# Patient Record
Sex: FEMALE | Race: WHITE | NOT HISPANIC OR LATINO | Employment: FULL TIME | URBAN - METROPOLITAN AREA
[De-identification: names, ages, dates, MRNs, and addresses within clinical notes are randomized per-mention and may not be internally consistent; named-entity substitution may affect disease eponyms.]

---

## 2017-06-21 ENCOUNTER — TRANSCRIBE ORDERS (OUTPATIENT)
Dept: MAMMOGRAPHY | Facility: HOSPITAL | Age: 47
End: 2017-06-21

## 2017-06-21 DIAGNOSIS — Z12.31 VISIT FOR SCREENING MAMMOGRAM: Primary | ICD-10-CM

## 2017-07-18 ENCOUNTER — TRANSCRIBE ORDERS (OUTPATIENT)
Dept: ADMINISTRATIVE | Facility: HOSPITAL | Age: 47
End: 2017-07-18

## 2017-07-18 DIAGNOSIS — Z12.31 VISIT FOR SCREENING MAMMOGRAM: Primary | ICD-10-CM

## 2017-07-21 ENCOUNTER — APPOINTMENT (OUTPATIENT)
Dept: MAMMOGRAPHY | Facility: HOSPITAL | Age: 47
End: 2017-07-21
Attending: OBSTETRICS & GYNECOLOGY

## 2017-07-21 ENCOUNTER — HOSPITAL ENCOUNTER (OUTPATIENT)
Dept: MAMMOGRAPHY | Facility: HOSPITAL | Age: 47
Discharge: HOME | End: 2017-07-21
Attending: OBSTETRICS & GYNECOLOGY | Admitting: OBSTETRICS & GYNECOLOGY

## 2017-07-21 DIAGNOSIS — Z12.31 VISIT FOR SCREENING MAMMOGRAM: ICD-10-CM

## 2017-07-21 PROCEDURE — 77063 BREAST TOMOSYNTHESIS BI: CPT | Performed by: RADIOLOGY

## 2017-07-21 PROCEDURE — G0202 SCR MAMMO BI INCL CAD: HCPCS

## 2017-07-21 PROCEDURE — 77063 BREAST TOMOSYNTHESIS BI: CPT

## 2017-07-21 PROCEDURE — 77067 SCR MAMMO BI INCL CAD: CPT | Performed by: RADIOLOGY

## 2017-07-28 ENCOUNTER — APPOINTMENT (OUTPATIENT)
Dept: MAMMOGRAPHY | Facility: HOSPITAL | Age: 47
End: 2017-07-28
Attending: OBSTETRICS & GYNECOLOGY

## 2018-06-14 ENCOUNTER — TRANSCRIBE ORDERS (OUTPATIENT)
Dept: ADMINISTRATIVE | Facility: HOSPITAL | Age: 48
End: 2018-06-14

## 2018-06-14 DIAGNOSIS — Z12.31 VISIT FOR SCREENING MAMMOGRAM: Primary | ICD-10-CM

## 2018-07-27 ENCOUNTER — HOSPITAL ENCOUNTER (OUTPATIENT)
Dept: MAMMOGRAPHY | Facility: HOSPITAL | Age: 48
Discharge: HOME | End: 2018-07-27
Attending: OBSTETRICS & GYNECOLOGY | Admitting: OBSTETRICS & GYNECOLOGY

## 2018-07-27 DIAGNOSIS — Z12.31 VISIT FOR SCREENING MAMMOGRAM: ICD-10-CM

## 2018-07-27 PROCEDURE — 77063 BREAST TOMOSYNTHESIS BI: CPT

## 2018-07-27 PROCEDURE — 77067 SCR MAMMO BI INCL CAD: CPT

## 2018-07-27 PROCEDURE — 77063 BREAST TOMOSYNTHESIS BI: CPT | Performed by: RADIOLOGY

## 2018-07-27 PROCEDURE — 77067 SCR MAMMO BI INCL CAD: CPT | Performed by: RADIOLOGY

## 2019-06-18 ENCOUNTER — TRANSCRIBE ORDERS (OUTPATIENT)
Dept: ADMINISTRATIVE | Facility: HOSPITAL | Age: 49
End: 2019-06-18

## 2019-06-18 DIAGNOSIS — Z12.31 VISIT FOR SCREENING MAMMOGRAM: Primary | ICD-10-CM

## 2019-08-15 ENCOUNTER — APPOINTMENT (OUTPATIENT)
Dept: LAB | Facility: HOSPITAL | Age: 49
End: 2019-08-15

## 2019-08-15 ENCOUNTER — HOSPITAL ENCOUNTER (OUTPATIENT)
Dept: MAMMOGRAPHY | Facility: HOSPITAL | Age: 49
Discharge: HOME | End: 2019-08-15
Admitting: OBSTETRICS & GYNECOLOGY

## 2019-08-15 ENCOUNTER — TRANSCRIBE ORDERS (OUTPATIENT)
Dept: LAB | Facility: HOSPITAL | Age: 49
End: 2019-08-15

## 2019-08-15 DIAGNOSIS — Z12.31 VISIT FOR SCREENING MAMMOGRAM: ICD-10-CM

## 2019-08-15 DIAGNOSIS — F31.61 MILD MIXED BIPOLAR I DISORDER (HCC): Primary | ICD-10-CM

## 2019-08-15 LAB — HBA1C MFR BLD: 5.1 % (ref 4.8–5.6)

## 2019-08-15 PROCEDURE — 77067 SCR MAMMO BI INCL CAD: CPT

## 2019-08-15 PROCEDURE — 36415 COLL VENOUS BLD VENIPUNCTURE: CPT | Performed by: NURSE PRACTITIONER

## 2019-08-15 PROCEDURE — 77067 SCR MAMMO BI INCL CAD: CPT | Performed by: RADIOLOGY

## 2019-08-15 PROCEDURE — 83036 HEMOGLOBIN GLYCOSYLATED A1C: CPT | Performed by: NURSE PRACTITIONER

## 2019-08-15 PROCEDURE — 77063 BREAST TOMOSYNTHESIS BI: CPT | Performed by: RADIOLOGY

## 2019-08-15 PROCEDURE — 77063 BREAST TOMOSYNTHESIS BI: CPT

## 2020-07-27 ENCOUNTER — TRANSCRIBE ORDERS (OUTPATIENT)
Dept: ADMINISTRATIVE | Facility: HOSPITAL | Age: 50
End: 2020-07-27

## 2020-07-27 DIAGNOSIS — Z12.31 VISIT FOR SCREENING MAMMOGRAM: Primary | ICD-10-CM

## 2021-09-20 ENCOUNTER — TRANSCRIBE ORDERS (OUTPATIENT)
Dept: ADMINISTRATIVE | Facility: HOSPITAL | Age: 51
End: 2021-09-20

## 2021-09-20 DIAGNOSIS — Z12.31 VISIT FOR SCREENING MAMMOGRAM: Primary | ICD-10-CM

## 2021-11-23 ENCOUNTER — HOSPITAL ENCOUNTER (OUTPATIENT)
Dept: MAMMOGRAPHY | Facility: HOSPITAL | Age: 51
Discharge: HOME | End: 2021-11-23

## 2021-11-23 DIAGNOSIS — Z12.31 VISIT FOR SCREENING MAMMOGRAM: ICD-10-CM

## 2022-02-09 ENCOUNTER — APPOINTMENT (OUTPATIENT)
Dept: MAMMOGRAPHY | Facility: HOSPITAL | Age: 52
End: 2022-02-09

## 2022-03-25 ENCOUNTER — HOSPITAL ENCOUNTER (OUTPATIENT)
Dept: MAMMOGRAPHY | Facility: HOSPITAL | Age: 52
Discharge: HOME | End: 2022-03-25
Admitting: OBSTETRICS & GYNECOLOGY

## 2022-03-25 PROCEDURE — 77067 SCR MAMMO BI INCL CAD: CPT

## 2022-03-25 PROCEDURE — 77067 SCR MAMMO BI INCL CAD: CPT | Performed by: RADIOLOGY

## 2022-03-25 PROCEDURE — 77063 BREAST TOMOSYNTHESIS BI: CPT

## 2022-03-25 PROCEDURE — 77063 BREAST TOMOSYNTHESIS BI: CPT | Performed by: RADIOLOGY

## 2022-07-26 ENCOUNTER — LAB (OUTPATIENT)
Dept: LAB | Facility: HOSPITAL | Age: 52
End: 2022-07-26

## 2022-07-26 ENCOUNTER — OFFICE VISIT (OUTPATIENT)
Dept: INTERNAL MEDICINE | Facility: CLINIC | Age: 52
End: 2022-07-26

## 2022-07-26 VITALS
WEIGHT: 139.6 LBS | HEIGHT: 66 IN | TEMPERATURE: 97.2 F | HEART RATE: 83 BPM | SYSTOLIC BLOOD PRESSURE: 122 MMHG | OXYGEN SATURATION: 97 % | DIASTOLIC BLOOD PRESSURE: 72 MMHG | RESPIRATION RATE: 16 BRPM | BODY MASS INDEX: 22.43 KG/M2

## 2022-07-26 DIAGNOSIS — R07.9 CHEST PAIN, UNSPECIFIED TYPE: ICD-10-CM

## 2022-07-26 DIAGNOSIS — Z79.899 HIGH RISK MEDICATION USE: ICD-10-CM

## 2022-07-26 DIAGNOSIS — F31.9 BIPOLAR 1 DISORDER: ICD-10-CM

## 2022-07-26 DIAGNOSIS — G62.9 NEUROPATHY: Primary | ICD-10-CM

## 2022-07-26 DIAGNOSIS — N93.9 ABNORMAL UTERINE BLEEDING: ICD-10-CM

## 2022-07-26 PROCEDURE — 99214 OFFICE O/P EST MOD 30 MIN: CPT | Performed by: NURSE PRACTITIONER

## 2022-07-26 RX ORDER — GABAPENTIN 300 MG/1
300 CAPSULE ORAL 3 TIMES DAILY
Qty: 90 CAPSULE | Refills: 0 | Status: SHIPPED | OUTPATIENT
Start: 2022-07-26 | End: 2022-08-16 | Stop reason: SDUPTHER

## 2022-07-26 RX ORDER — FEXOFENADINE HCL AND PSEUDOEPHEDRINE HCI 180; 240 MG/1; MG/1
1 TABLET, EXTENDED RELEASE ORAL DAILY
COMMUNITY

## 2022-07-26 RX ORDER — MELOXICAM 15 MG/1
TABLET ORAL
COMMUNITY
Start: 2022-06-28

## 2022-07-26 RX ORDER — PROPRANOLOL HYDROCHLORIDE 10 MG/1
10 TABLET ORAL 2 TIMES DAILY
COMMUNITY

## 2022-07-26 NOTE — PROGRESS NOTES
"  New Patient Office Visit      Patient Name: Xiomara Taylor  : 1970   MRN: [id number]     Chief Complaint:    Chief Complaint   Patient presents with   • Back Pain     New patient   • Chest Pain       History of Present Illness: Xiomara Taylor is a 51 y.o. female presents to clinic today to establish care.   Are you currently seeing any other doctors or specialists?   Psychiatrist-Luz Maria Jimenez ([facility])  Ortho -Dr. Pereyra as needed  Previous pcp Dr. Adan  Gynecology-Corine Bright     Back pain  L3-L5? degenerative disc disease; has had steroid shots which usually help. She has occasional flares. She sees a chiropractor.     Neuropathy   S/p Anterior discectomy C7-T1 for foraminal disc herniation in 2018. She continues with  right nerve pain 4-5 right fingers. She has been on Gabapentin 300 mg TID for several years.  It controls the pain. She uses CBD drops also. Denies any excessive drowsiness.     Osteoarthritis  Patient has arthritis in feet and both knees. Patient takes meloxicam daily. Denies any stomach upset or bleeding.     Bipolar disorder and Anxiety  She take seroquel 100 mg daily and lamotrigine 100 mg. She takes propranolol as needed for anxiety; takes 1-2 x a day.     Chest pain  She has been under a lot of stress. She has rare chest pains over the last  6 months lasting for a 1-2 seconds.  Quality is \"squeezing\".  Chest pains are usually associated with anxiety.  She denies any exertional chest pain or shortness of breath.      Last menstrual period 2 years ago; she had recent spotting.     Health Maintenance  She has never had a colorectal screening.  She is due for Pap smear.    Subjective     Review of System: Review of Systems   Constitutional: Negative for fatigue and fever.   HENT: Negative for congestion and rhinorrhea.    Respiratory: Negative for cough, shortness of breath and wheezing.    Cardiovascular: Positive for chest pain. Negative for palpitations.   Gastrointestinal: " Negative for abdominal pain, diarrhea, nausea and vomiting.   Musculoskeletal: Negative for neck pain.   Skin: Negative for rash.   Neurological: Positive for numbness (right finger 4-5 ). Negative for headaches.   Hematological: Negative for adenopathy.   Psychiatric/Behavioral: Negative for dysphoric mood.      I have reviewed the ROS documented by my clinical staff, updated appropriately and I agree. VANESSA Teixeira    Past Medical History:   Past Medical History:   Diagnosis Date   • Anxiety    • Arthritis    • Bipolar 1 disorder (HCC)    • Depression    • Pancreatitis        Past Surgical History:   Past Surgical History:   Procedure Laterality Date   • CERVICAL DISCECTOMY ANTERIOR     • THORACIC DISCECTOMY         Family History:   Family History   Problem Relation Age of Onset   • Hypertension Mother    • Arthritis Mother    • Mental illness Maternal Aunt    • Hypertension Maternal Aunt    • Diabetes Maternal Aunt    • Cancer Maternal Aunt    • Heart disease Maternal Uncle    • Diabetes Maternal Uncle    • Mental illness Maternal Grandmother    • Breast cancer Other         age unknown   • Ovarian cancer Neg Hx        Social History:   Social History     Socioeconomic History   • Marital status: Single   Tobacco Use   • Smoking status: Unknown If Ever Smoked   • Smokeless tobacco: Never Used   Vaping Use   • Vaping Use: Never used   Substance and Sexual Activity   • Alcohol use: Not Currently     Comment: quit in 2014   • Drug use: Never   • Sexual activity: Defer       Medications:     Current Outpatient Medications:   •  albuterol sulfate  (90 Base) MCG/ACT inhaler, Every 4 (Four) Hours., Disp: , Rfl:   •  CBD (cannabidiol) oral oil, cannabidiol (CBD) oral oil  Take by oral route., Disp: , Rfl:   •  fexofenadine-pseudoephedrine (ALLEGRA-D 24) 180-240 MG per 24 hr tablet, Take 1 tablet by mouth Daily., Disp: , Rfl:   •  gabapentin (NEURONTIN) 300 MG capsule, Take 1 capsule by mouth 3 (Three)  "Times a Day., Disp: 90 capsule, Rfl: 0  •  lamoTRIgine (LaMICtal) 100 MG tablet, Take 100 mg by mouth Daily., Disp: , Rfl:   •  meloxicam (MOBIC) 15 MG tablet, , Disp: , Rfl:   •  propranolol (INDERAL) 10 MG tablet, Take 10 mg by mouth 2 (Two) Times a Day., Disp: , Rfl:   •  QUEtiapine (SEROquel) 100 MG tablet, , Disp: , Rfl:     Allergies:   Allergies   Allergen Reactions   • Augmentin [Amoxicillin-Pot Clavulanate] Hives   • Diflucan [Fluconazole] Hives       Objective     Physical Exam:   Vital Signs:   Vitals:    07/26/22 1029   BP: 122/72   BP Location: Right arm   Patient Position: Sitting   Cuff Size: Adult   Pulse: 83   Resp: 16   Temp: 97.2 °F (36.2 °C)   TempSrc: Infrared   SpO2: 97%   Weight: 63.3 kg (139 lb 9.6 oz)   Height: 167 cm (65.75\")   PainSc:   4     Body mass index is 22.7 kg/m². BMI is within normal parameters. No other follow-up for BMI required.      Physical Exam  Constitutional:       General: She is not in acute distress.     Appearance: She is not ill-appearing.   HENT:      Head: Normocephalic.   Cardiovascular:      Rate and Rhythm: Normal rate and regular rhythm.      Heart sounds: Normal heart sounds. No murmur heard.  Pulmonary:      Breath sounds: Normal breath sounds.   Abdominal:      General: Bowel sounds are normal.      Tenderness: There is no abdominal tenderness.   Musculoskeletal:         General: No tenderness. Normal range of motion.   Lymphadenopathy:      Cervical: No cervical adenopathy.   Neurological:      General: No focal deficit present.      Mental Status: She is oriented to person, place, and time.   Psychiatric:         Mood and Affect: Mood normal.         Assessment / Plan      Assessment/Plan:   Diagnoses and all orders for this visit:    1. Neuropathy (Primary)  -     gabapentin (NEURONTIN) 300 MG capsule; Take 1 capsule by mouth 3 (Three) Times a Day.  Dispense: 90 capsule; Refill: 0      The patient is taking the following controlled substance(s) on a long " term (greater than three months) basis: Gabapentin.    A history was obtained from the patient and the following were reviewed: family history, social history, psychiatric history, and substance abuse history.  A baseline assessment was performed and includes a controlled substance agreement, urine drug screen, BISI (within 12 months prior to today's encounter), and a physical exam, if appropriate, was performed within the past year.  It is medically appropriate to prescribe her the medication(s) above.  If applicable, prior treatment records were obtained and reviewed to justify long term prescribing of controlled substances.  The patient was educated on the following: Limited use of the medication, need to discontinue the medication when her condition resolves, proper storage and disposal of medication(s), and risks and dangers associated with controlled substances including tolerance, dependence, and addiction.  A written informed consent was obtained and includes objectives of treatment, further diagnostic testing if appropriate, and an exit strategy for discontinuing the medication on the condition resolves, if appropriate.  In addition, if appropriate, the patient will be screened for other medical conditions that may impact the prescribing of controlled substances.  Previous treatments have been tried including trials of noncontrolled substances or lower doses of controlled substances.  The controlled medication(s) have been titrated to the level appropriate and necessary and the medication(s) are not causing unacceptable side effects.   Bisi obtained.     2. Abnormal uterine bleeding  -     US Non-ob Transvaginal; Future    3. Bipolar 1 disorder (HCC)  Stable on current medications.    4. Chest pain, unspecified type  Patient declined any testing at this time.  She will monitor her symptoms and let me know if they worsen.  Symptoms could be related to stress and anxiety or they could be cardiac in  nature.  If any worsening I would like to do a full cardiac work-up.    5. High risk medication use  -     Cancel: Urine Drug Screen - Urine, Clean Catch; Future  -     Compliance Drug Analysis, Ur - Urine, Clean Catch; Future  -     Compliance Drug Analysis, Ur - Urine, Clean Catch             I explained and discussed patient's condition and plan of care.  Discussed when to follow-up.  Discussed possible red flags and how to follow-up with those.  Viewed patient's medications and discussed common side effects. Patient to continue current medications as advised.  Be compliant with medications. Patient to let me know if she has any changes in health, does not tolerate medication, or any future concerns about treatment. Patient verbalized understanding and agreement with plan of care.     Follow Up:   Return in about 3 months (around 10/26/2022) for Annual.    VANESSA Teixeira  [facility]

## 2022-08-01 LAB — DRUGS UR: NORMAL

## 2022-08-16 DIAGNOSIS — G62.9 NEUROPATHY: ICD-10-CM

## 2022-08-16 NOTE — TELEPHONE ENCOUNTER
Caller: Xiomara Taylor    Relationship: Self    Best call back number: [phone]    Requested Prescriptions:   Requested Prescriptions     Pending Prescriptions Disp Refills   • gabapentin (NEURONTIN) 300 MG capsule 90 capsule 0     Sig: Take 1 capsule by mouth 3 (Three) Times a Day.        Pharmacy where request should be sent: [facility] [address] [place] [address] [address] [facility] AT [address] [phone] [facility] [phone] FX     Additional details provided by patient:   Does the patient have less than a 3 day supply:  [] Yes  [x] No    Nevaeh Carlos Rep   08/16/22 10:17 EDT

## 2022-08-17 ENCOUNTER — APPOINTMENT (OUTPATIENT)
Dept: ULTRASOUND IMAGING | Facility: HOSPITAL | Age: 52
End: 2022-08-17

## 2022-08-19 ENCOUNTER — APPOINTMENT (OUTPATIENT)
Dept: ULTRASOUND IMAGING | Facility: HOSPITAL | Age: 52
End: 2022-08-19

## 2022-08-24 RX ORDER — GABAPENTIN 300 MG/1
300 CAPSULE ORAL 3 TIMES DAILY
Qty: 90 CAPSULE | Refills: 0 | Status: SHIPPED | OUTPATIENT
Start: 2022-08-24 | End: 2022-10-03

## 2022-09-20 ENCOUNTER — OFFICE VISIT (OUTPATIENT)
Dept: INTERNAL MEDICINE | Facility: CLINIC | Age: 52
End: 2022-09-20

## 2022-09-20 VITALS
SYSTOLIC BLOOD PRESSURE: 126 MMHG | DIASTOLIC BLOOD PRESSURE: 84 MMHG | HEIGHT: 66 IN | RESPIRATION RATE: 16 BRPM | BODY MASS INDEX: 22.92 KG/M2 | HEART RATE: 87 BPM | OXYGEN SATURATION: 98 % | TEMPERATURE: 96.6 F | WEIGHT: 142.6 LBS

## 2022-09-20 DIAGNOSIS — G62.9 NEUROPATHY: Primary | ICD-10-CM

## 2022-09-20 DIAGNOSIS — F31.9 BIPOLAR 1 DISORDER: ICD-10-CM

## 2022-09-20 PROCEDURE — 99213 OFFICE O/P EST LOW 20 MIN: CPT | Performed by: NURSE PRACTITIONER

## 2022-09-20 NOTE — PROGRESS NOTES
Patient Name: Xiomara Taylor  : 1970   MRN: [id number]     Chief Complaint:    Chief Complaint   Patient presents with   • clearance to drive     Clearance to drive at night.        History of Present Illness: Xiomara Taylor is a 51 y.o. female presents to clinic forfor clearance to drive. Her license had a vision restriction that needs to be removed. She wears glasses. She has never had a seizure.    Neuropathy   S/p Anterior discectomy C7-T1 for foraminal disc herniation in 2018. She has  right nerve pain 4-5 right fingers. She has been on Gabapentin 300 mg TID for several years.  It controls the pain. She uses CBD drops also. Denies any excessive drowsiness.        Bipolar disorder and Anxiety  She take seroquel 100 mg daily and lamotrigine 100 mg. She takes propranolol as needed for anxiety; takes 1-2 x a day. Denies suicidal ideations. Mood is stable.             Subjective     Review of System: Review of Systems   Constitutional: Negative for fatigue and fever.   HENT: Negative for congestion and rhinorrhea.    Respiratory: Negative for cough, shortness of breath and wheezing.    Cardiovascular: Negative for chest pain and palpitations.   Gastrointestinal: Negative for abdominal pain, diarrhea, nausea and vomiting.   Genitourinary: Negative for dysuria.   Musculoskeletal: Negative for myalgias.   Skin: Negative for rash.   Neurological: Positive for numbness. Negative for seizures and headaches.   Hematological: Negative for adenopathy.   Psychiatric/Behavioral: Negative for dysphoric mood.        Medications:     Current Outpatient Medications:   •  albuterol sulfate  (90 Base) MCG/ACT inhaler, Every 4 (Four) Hours., Disp: , Rfl:   •  CBD (cannabidiol) oral oil, cannabidiol (CBD) oral oil  Take by oral route., Disp: , Rfl:   •  fexofenadine-pseudoephedrine (ALLEGRA-D 24) 180-240 MG per 24 hr tablet, Take 1 tablet by mouth Daily., Disp: , Rfl:   •  gabapentin (NEURONTIN) 300 MG capsule, Take 1  "capsule by mouth 3 (Three) Times a Day., Disp: 90 capsule, Rfl: 0  •  lamoTRIgine (LaMICtal) 100 MG tablet, Take 100 mg by mouth Daily., Disp: , Rfl:   •  meloxicam (MOBIC) 15 MG tablet, , Disp: , Rfl:   •  propranolol (INDERAL) 10 MG tablet, Take 10 mg by mouth 2 (Two) Times a Day., Disp: , Rfl:   •  QUEtiapine (SEROquel) 100 MG tablet, , Disp: , Rfl:     Allergies:   Allergies   Allergen Reactions   • Augmentin [Amoxicillin-Pot Clavulanate] Hives   • Diflucan [Fluconazole] Hives       Objective     Physical Exam:   Vital Signs:   Vitals:    09/20/22 1520   BP: 126/84   BP Location: Right arm   Patient Position: Sitting   Cuff Size: Adult   Pulse: 87   Resp: 16   Temp: 96.6 °F (35.9 °C)   TempSrc: Infrared   SpO2: 98%   Weight: 64.7 kg (142 lb 9.6 oz)   Height: 167 cm (65.75\")   PainSc: 0-No pain     Body mass index is 23.19 kg/m². BMI is within normal parameters. No other follow-up for BMI required.      Physical Exam  Constitutional:       General: She is not in acute distress.     Appearance: She is not ill-appearing.   HENT:      Head: Normocephalic.   Cardiovascular:      Rate and Rhythm: Normal rate and regular rhythm.      Heart sounds: Normal heart sounds. No murmur heard.  Pulmonary:      Breath sounds: Normal breath sounds.   Abdominal:      General: Bowel sounds are normal.      Tenderness: There is no abdominal tenderness.   Neurological:      General: No focal deficit present.      Mental Status: She is oriented to person, place, and time.   Psychiatric:         Mood and Affect: Mood normal.         Assessment / Plan      Assessment/Plan:   Diagnoses and all orders for this visit:    1. Neuropathy (Primary)  Continue current medications.    2. Bipolar 1 disorder (HCC)  Continue current medications.     Forms completed.            Follow Up:   Return for Next scheduled follow up.    VANESSA Teixeira  [facility]  "

## 2022-09-27 ENCOUNTER — TELEPHONE (OUTPATIENT)
Dept: INTERNAL MEDICINE | Facility: CLINIC | Age: 52
End: 2022-09-27

## 2022-09-28 ENCOUNTER — TELEPHONE (OUTPATIENT)
Dept: INTERNAL MEDICINE | Facility: CLINIC | Age: 52
End: 2022-09-28

## 2022-10-03 ENCOUNTER — TELEPHONE (OUTPATIENT)
Dept: INTERNAL MEDICINE | Facility: CLINIC | Age: 52
End: 2022-10-03

## 2022-10-03 DIAGNOSIS — G62.9 NEUROPATHY: ICD-10-CM

## 2022-10-03 RX ORDER — GABAPENTIN 300 MG/1
CAPSULE ORAL
Qty: 90 CAPSULE | Refills: 0 | Status: SHIPPED | OUTPATIENT
Start: 2022-10-03 | End: 2022-11-03

## 2022-10-03 NOTE — TELEPHONE ENCOUNTER
Rx Refill Note  Requested Prescriptions     Pending Prescriptions Disp Refills   • gabapentin (NEURONTIN) 300 MG capsule [Pharmacy Med Name: GABAPENTIN 300 MG CAPSULE] 90 capsule      Sig: TAKE ONE CAPSULE BY MOUTH THREE TIMES A DAY      Last office visit with prescribing clinician: 9/20/2022      Next office visit with prescribing clinician: 10/27/2022            Maryam Solis MA  10/03/22, 14:52 EDT

## 2022-10-03 NOTE — TELEPHONE ENCOUNTER
----- Message from Xiomara Taylor sent at 10/3/2022  9:03 AM EDT -----  Regarding: Denis   Their fax number is [phone]    The phone number is [phone]    I have been trying to get the medical review board to correct their own mistake since early August. They just keep asking for additional forms like this. I’m sorry for all the trouble. Thank you so much for your help!

## 2022-10-03 NOTE — TELEPHONE ENCOUNTER
I spoke with patient and notified her that we cannot release her jacklyn to anyone. She verbalized understanding.    She asks that we go ahead and fax in the rest of her packet

## 2022-10-03 NOTE — TELEPHONE ENCOUNTER
----- Message from Xiomara Taylor sent at 10/3/2022  6:45 AM EDT -----  Regarding: Jacklyn   I am writing to clarify my request for the jacklyn report. I do not need to have it released to me, only to the medical review board. The fax number is I. The packet that [employer]. I just need the packet faxed to that number. This involves having a restriction. That was placed in my drivers license in error. That packet is the only thing that will allow that. Please please please at least let me know asap that you will be able to fax that. I don’t need the document to ever be in my hands. Just the review board. I have to have this faxed in order to correct my license.  I’m begging you to either fax it or let me know if you can’t. I have to fix my license! Please help!

## 2022-10-27 ENCOUNTER — LAB (OUTPATIENT)
Dept: LAB | Facility: HOSPITAL | Age: 52
End: 2022-10-27

## 2022-10-27 ENCOUNTER — OFFICE VISIT (OUTPATIENT)
Dept: INTERNAL MEDICINE | Facility: CLINIC | Age: 52
End: 2022-10-27

## 2022-10-27 VITALS
DIASTOLIC BLOOD PRESSURE: 88 MMHG | HEIGHT: 66 IN | BODY MASS INDEX: 22.5 KG/M2 | OXYGEN SATURATION: 98 % | TEMPERATURE: 97.3 F | WEIGHT: 140 LBS | RESPIRATION RATE: 18 BRPM | SYSTOLIC BLOOD PRESSURE: 128 MMHG | HEART RATE: 81 BPM

## 2022-10-27 DIAGNOSIS — M25.441 FINGER JOINT SWELLING, RIGHT: ICD-10-CM

## 2022-10-27 DIAGNOSIS — Z12.31 ENCOUNTER FOR SCREENING MAMMOGRAM FOR BREAST CANCER: ICD-10-CM

## 2022-10-27 DIAGNOSIS — Z23 COVID-19 VACCINE ADMINISTERED: ICD-10-CM

## 2022-10-27 DIAGNOSIS — Z13.6 ENCOUNTER FOR LIPID SCREENING FOR CARDIOVASCULAR DISEASE: ICD-10-CM

## 2022-10-27 DIAGNOSIS — Z01.419 ENCOUNTER FOR CERVICAL PAP SMEAR WITH PELVIC EXAM: ICD-10-CM

## 2022-10-27 DIAGNOSIS — Z12.12 ENCOUNTER FOR COLORECTAL CANCER SCREENING: ICD-10-CM

## 2022-10-27 DIAGNOSIS — F31.9 BIPOLAR 1 DISORDER: ICD-10-CM

## 2022-10-27 DIAGNOSIS — Z23 NEEDS FLU SHOT: ICD-10-CM

## 2022-10-27 DIAGNOSIS — G62.9 NEUROPATHY: ICD-10-CM

## 2022-10-27 DIAGNOSIS — Z00.01 ABNORMAL WELLNESS EXAM: Primary | ICD-10-CM

## 2022-10-27 DIAGNOSIS — Z11.59 ENCOUNTER FOR HEPATITIS C SCREENING TEST FOR LOW RISK PATIENT: ICD-10-CM

## 2022-10-27 DIAGNOSIS — Z13.220 ENCOUNTER FOR LIPID SCREENING FOR CARDIOVASCULAR DISEASE: ICD-10-CM

## 2022-10-27 DIAGNOSIS — Z12.11 ENCOUNTER FOR COLORECTAL CANCER SCREENING: ICD-10-CM

## 2022-10-27 DIAGNOSIS — R31.9 HEMATURIA, UNSPECIFIED TYPE: ICD-10-CM

## 2022-10-27 LAB
25(OH)D3 SERPL-MCNC: 35.6 NG/ML (ref 30–100)
ALBUMIN SERPL-MCNC: 4.5 G/DL (ref 3.5–5.2)
ALBUMIN/GLOB SERPL: 2.4 G/DL
ALP SERPL-CCNC: 82 U/L (ref 39–117)
ALT SERPL W P-5'-P-CCNC: 10 U/L (ref 1–33)
ANION GAP SERPL CALCULATED.3IONS-SCNC: 8.9 MMOL/L (ref 5–15)
AST SERPL-CCNC: 15 U/L (ref 1–32)
BACTERIA UR QL AUTO: ABNORMAL /HPF
BASOPHILS # BLD AUTO: 0.07 10*3/MM3 (ref 0–0.2)
BASOPHILS NFR BLD AUTO: 0.9 % (ref 0–1.5)
BILIRUB BLD-MCNC: NEGATIVE MG/DL
BILIRUB SERPL-MCNC: 0.3 MG/DL (ref 0–1.2)
BUN SERPL-MCNC: 15 MG/DL (ref 6–20)
BUN/CREAT SERPL: 18.8 (ref 7–25)
CALCIUM SPEC-SCNC: 9.7 MG/DL (ref 8.6–10.5)
CHLORIDE SERPL-SCNC: 105 MMOL/L (ref 98–107)
CHOLEST SERPL-MCNC: 198 MG/DL (ref 0–200)
CLARITY, POC: CLEAR
CO2 SERPL-SCNC: 28.1 MMOL/L (ref 22–29)
COLOR UR: YELLOW
CREAT SERPL-MCNC: 0.8 MG/DL (ref 0.57–1)
DEPRECATED RDW RBC AUTO: 38.6 FL (ref 37–54)
EGFRCR SERPLBLD CKD-EPI 2021: 89.3 ML/MIN/1.73
EOSINOPHIL # BLD AUTO: 0.07 10*3/MM3 (ref 0–0.4)
EOSINOPHIL NFR BLD AUTO: 0.9 % (ref 0.3–6.2)
ERYTHROCYTE [DISTWIDTH] IN BLOOD BY AUTOMATED COUNT: 12.1 % (ref 12.3–15.4)
EXPIRATION DATE: ABNORMAL
GLOBULIN UR ELPH-MCNC: 1.9 GM/DL
GLUCOSE SERPL-MCNC: 74 MG/DL (ref 65–99)
GLUCOSE UR STRIP-MCNC: NEGATIVE MG/DL
HCT VFR BLD AUTO: 43.4 % (ref 34–46.6)
HCV AB SER DONR QL: NORMAL
HDLC SERPL-MCNC: 69 MG/DL (ref 40–60)
HGB BLD-MCNC: 15 G/DL (ref 12–15.9)
HYALINE CASTS UR QL AUTO: ABNORMAL /LPF
IMM GRANULOCYTES # BLD AUTO: 0.01 10*3/MM3 (ref 0–0.05)
IMM GRANULOCYTES NFR BLD AUTO: 0.1 % (ref 0–0.5)
KETONES UR QL: NEGATIVE
LDLC SERPL CALC-MCNC: 111 MG/DL (ref 0–100)
LDLC/HDLC SERPL: 1.58 {RATIO}
LEUKOCYTE EST, POC: NEGATIVE
LYMPHOCYTES # BLD AUTO: 2.25 10*3/MM3 (ref 0.7–3.1)
LYMPHOCYTES NFR BLD AUTO: 29.4 % (ref 19.6–45.3)
Lab: ABNORMAL
MCH RBC QN AUTO: 29.8 PG (ref 26.6–33)
MCHC RBC AUTO-ENTMCNC: 34.6 G/DL (ref 31.5–35.7)
MCV RBC AUTO: 86.3 FL (ref 79–97)
MONOCYTES # BLD AUTO: 0.59 10*3/MM3 (ref 0.1–0.9)
MONOCYTES NFR BLD AUTO: 7.7 % (ref 5–12)
NEUTROPHILS NFR BLD AUTO: 4.67 10*3/MM3 (ref 1.7–7)
NEUTROPHILS NFR BLD AUTO: 61 % (ref 42.7–76)
NITRITE UR-MCNC: NEGATIVE MG/ML
NRBC BLD AUTO-RTO: 0 /100 WBC (ref 0–0.2)
PH UR: 8 [PH] (ref 5–8)
PLATELET # BLD AUTO: 268 10*3/MM3 (ref 140–450)
PMV BLD AUTO: 9.1 FL (ref 6–12)
POTASSIUM SERPL-SCNC: 4.2 MMOL/L (ref 3.5–5.2)
PROT SERPL-MCNC: 6.4 G/DL (ref 6–8.5)
PROT UR STRIP-MCNC: NEGATIVE MG/DL
RBC # BLD AUTO: 5.03 10*6/MM3 (ref 3.77–5.28)
RBC # UR STRIP: ABNORMAL /HPF
RBC # UR STRIP: ABNORMAL /UL
REF LAB TEST METHOD: ABNORMAL
SODIUM SERPL-SCNC: 142 MMOL/L (ref 136–145)
SP GR UR: 1.01 (ref 1–1.03)
SQUAMOUS #/AREA URNS HPF: ABNORMAL /HPF
TRIGL SERPL-MCNC: 100 MG/DL (ref 0–150)
TSH SERPL DL<=0.05 MIU/L-ACNC: 0.79 UIU/ML (ref 0.27–4.2)
UROBILINOGEN UR QL: NORMAL
VLDLC SERPL-MCNC: 18 MG/DL (ref 5–40)
WBC # UR STRIP: ABNORMAL /HPF
WBC NRBC COR # BLD: 7.66 10*3/MM3 (ref 3.4–10.8)

## 2022-10-27 PROCEDURE — 0124A PR ADM SARSCOV2 30MCG/0.3ML BST: CPT | Performed by: NURSE PRACTITIONER

## 2022-10-27 PROCEDURE — 91312 COVID-19 (PFIZER) BIVALENT BOOSTER 12+YRS: CPT | Performed by: NURSE PRACTITIONER

## 2022-10-27 PROCEDURE — 81015 MICROSCOPIC EXAM OF URINE: CPT | Performed by: NURSE PRACTITIONER

## 2022-10-27 PROCEDURE — 82306 VITAMIN D 25 HYDROXY: CPT | Performed by: NURSE PRACTITIONER

## 2022-10-27 PROCEDURE — 81003 URINALYSIS AUTO W/O SCOPE: CPT | Performed by: NURSE PRACTITIONER

## 2022-10-27 PROCEDURE — 90471 IMMUNIZATION ADMIN: CPT | Performed by: NURSE PRACTITIONER

## 2022-10-27 PROCEDURE — 80061 LIPID PANEL: CPT | Performed by: NURSE PRACTITIONER

## 2022-10-27 PROCEDURE — 80050 GENERAL HEALTH PANEL: CPT | Performed by: NURSE PRACTITIONER

## 2022-10-27 PROCEDURE — 99396 PREV VISIT EST AGE 40-64: CPT | Performed by: NURSE PRACTITIONER

## 2022-10-27 PROCEDURE — 90686 IIV4 VACC NO PRSV 0.5 ML IM: CPT | Performed by: NURSE PRACTITIONER

## 2022-10-27 PROCEDURE — 86803 HEPATITIS C AB TEST: CPT | Performed by: NURSE PRACTITIONER

## 2022-10-27 RX ORDER — LAMOTRIGINE 200 MG/1
TABLET ORAL
COMMUNITY
Start: 2022-10-07

## 2022-10-27 RX ORDER — METHYLPREDNISOLONE 4 MG/1
TABLET ORAL
Qty: 21 TABLET | Refills: 0 | Status: SHIPPED | OUTPATIENT
Start: 2022-10-27 | End: 2023-01-16

## 2022-10-27 NOTE — PROGRESS NOTES
Female Physical Note      Patient Name: Xiomara Taylor  : 1970   MRN: [id number]     Chief Complaint:    Chief Complaint   Patient presents with   • Annual Exam       History of Present Illness: Xiomara Taylor is a 52 y.o. female who is here today for their annual health maintenance and physical.     Psychiatrist-Luz Maria Jimenez ([facility])  Ortho -Dr. Pereyra as needed  Previous pcp Dr. Adan    Sleep: 6.5-7 hours/ night      Regular dental visits: Yes, every 6 months  Regular eye exams: Yes, yearly, wears glasses    neuropathy   S/p Anterior discectomy C7-T1 for foraminal disc herniation in 2018. She has  right nerve pain 4-5 right fingers. She has been on Gabapentin 300 mg TID for several years.  It controls the pain. She uses CBD drops also. Denies any excessive drowsiness. It controls her symptoms.     Osteoarthritis  Patient has arthritis in feet and both knees. She has right finger joint swelling. Patient takes meloxicam daily. Denies any stomach upset or bleeding.     Bipolar disorder and Anxiety  She take seroquel 100 mg daily and lamotrigine 100 mg. She takes propranolol as needed for anxiety; takes 1-2 x a day. Sx controlled.   Subjective     Review of System: Review of Systems   Constitutional: Negative for fatigue and fever.   HENT: Negative for congestion and rhinorrhea.    Respiratory: Negative for shortness of breath and wheezing.    Cardiovascular: Negative for chest pain and palpitations.   Gastrointestinal: Negative for abdominal pain, diarrhea, nausea and vomiting.   Genitourinary: Negative for dysuria.   Musculoskeletal: Negative for myalgias.   Skin: Negative for rash.   Neurological: Negative for headaches.   Hematological: Negative for adenopathy.   Psychiatric/Behavioral: Negative for dysphoric mood.    GYN ROS: , no abnormal bleeding, pelvic pain or discharge, no breast pain or new or enlarging lumps on self exam. No neurological complaints.    I have reviewed the ROS documented by  my clinical staff, updated appropriately and I agree. VANESSA Teixeira    Past Medical History:   Past Medical History:   Diagnosis Date   • Anxiety    • Arthritis    • Bipolar 1 disorder (HCC)    • Depression    • Pancreatitis        Past Surgical History:   Past Surgical History:   Procedure Laterality Date   • CERVICAL DISCECTOMY ANTERIOR     • THORACIC DISCECTOMY         Family History:   Family History   Problem Relation Age of Onset   • Hypertension Mother    • Arthritis Mother    • Mental illness Maternal Aunt    • Hypertension Maternal Aunt    • Diabetes Maternal Aunt    • Cancer Maternal Aunt    • Heart disease Maternal Uncle    • Diabetes Maternal Uncle    • Mental illness Maternal Grandmother    • Breast cancer Other         age unknown   • Ovarian cancer Neg Hx        Social History:   Social History     Socioeconomic History   • Marital status: Single   Tobacco Use   • Smoking status: Some Days     Packs/day: 0.50     Years: 0.50     Pack years: 0.25     Types: Cigarettes   • Smokeless tobacco: Never   Vaping Use   • Vaping Use: Never used   Substance and Sexual Activity   • Alcohol use: Not Currently     Comment: quit in 2014   • Drug use: Never   • Sexual activity: Defer       Medications:     Current Outpatient Medications:   •  albuterol sulfate  (90 Base) MCG/ACT inhaler, Every 4 (Four) Hours., Disp: , Rfl:   •  CBD (cannabidiol) oral oil, cannabidiol (CBD) oral oil  Take by oral route., Disp: , Rfl:   •  fexofenadine-pseudoephedrine (ALLEGRA-D 24) 180-240 MG per 24 hr tablet, Take 1 tablet by mouth Daily., Disp: , Rfl:   •  gabapentin (NEURONTIN) 300 MG capsule, TAKE ONE CAPSULE BY MOUTH THREE TIMES A DAY, Disp: 90 capsule, Rfl: 0  •  lamoTRIgine (LaMICtal) 200 MG tablet, , Disp: , Rfl:   •  meloxicam (MOBIC) 15 MG tablet, , Disp: , Rfl:   •  propranolol (INDERAL) 10 MG tablet, Take 10 mg by mouth 2 (Two) Times a Day., Disp: , Rfl:   •  QUEtiapine (SEROquel) 100 MG tablet, , Disp: ,  "Rfl:   •  methylPREDNISolone (MEDROL) 4 MG dose pack, Take as directed on package instructions., Disp: 21 tablet, Rfl: 0    Allergies:   Allergies   Allergen Reactions   • Augmentin [Amoxicillin-Pot Clavulanate] Hives   • Diflucan [Fluconazole] Hives       Immunizations:  Td/Tdap(Booster Q 10 yrs): uptodate    Flu (Yearly): Advised and administered.   Hep C: Screen per guidelines    Colorectal Screening:   ordered  Last Completed Colonoscopy     This patient has no relevant Health Maintenance data.        Pap:  completed  Last Completed Pap Smear     This patient has no relevant Health Maintenance data.        Mammogram:    Last Completed Mammogram          Ordered - MAMMOGRAM (Every 2 Years) Ordered on 10/27/2022    03/25/2022  Mammo Screening Digital Tomosynthesis Bilateral With CAD    08/15/2019  Mammo Screening Digital Tomosynthesis Bilateral With CAD    07/27/2018  Mammo Screening Digital Tomosynthesis Bilateral With CAD    07/21/2017  Mammo Screening Digital Tomosynthesis Bilateral With CAD    07/05/2016  Mammo screening digital tomosynthesis bilateral w cad    Only the first 5 history entries have been loaded, but more history exists.                   Objective     Physical Exam:  Vital Signs:   Vitals:    10/27/22 0828   BP: 128/88   BP Location: Right arm   Patient Position: Sitting   Cuff Size: Adult   Pulse: 81   Resp: 18   Temp: 97.3 °F (36.3 °C)   TempSrc: Infrared   SpO2: 98%   Weight: 63.5 kg (140 lb)   Height: 167.6 cm (66\")   PainSc:   2     Body mass index is 22.6 kg/m². BMI is within normal parameters. No other follow-up for BMI required.      Physical Exam  Vitals and nursing note reviewed.   Constitutional:       General: She is not in acute distress.     Appearance: Normal appearance. She is not ill-appearing.   HENT:      Head: Normocephalic.      Right Ear: Tympanic membrane, ear canal and external ear normal. There is no impacted cerumen.      Left Ear: Tympanic membrane, ear canal and " external ear normal. There is no impacted cerumen.      Nose: No septal deviation, nasal tenderness or rhinorrhea.      Mouth/Throat:      Mouth: Mucous membranes are moist.      Pharynx: Oropharynx is clear. No oropharyngeal exudate or posterior oropharyngeal erythema.   Eyes:      General:         Right eye: No discharge.         Left eye: No discharge.      Extraocular Movements: Extraocular movements intact.      Conjunctiva/sclera: Conjunctivae normal.      Pupils: Pupils are equal, round, and reactive to light.   Neck:      Thyroid: No thyromegaly.      Vascular: No carotid bruit.   Cardiovascular:      Rate and Rhythm: Normal rate and regular rhythm.      Pulses: Normal pulses.      Heart sounds: Normal heart sounds. No murmur heard.    No gallop.   Pulmonary:      Effort: Pulmonary effort is normal.      Breath sounds: Normal breath sounds. No wheezing, rhonchi or rales.   Abdominal:      General: Bowel sounds are normal.      Palpations: Abdomen is soft. There is no mass.      Tenderness: There is no abdominal tenderness. There is no right CVA tenderness, left CVA tenderness or rebound.   Musculoskeletal:         General: No swelling or tenderness. Normal range of motion.      Cervical back: Normal range of motion.      Right lower leg: No edema.      Left lower leg: No edema.   Lymphadenopathy:      Cervical: No cervical adenopathy.   Skin:     General: Skin is warm and dry.      Capillary Refill: Capillary refill takes less than 2 seconds.      Findings: No erythema or rash.   Neurological:      General: No focal deficit present.      Mental Status: She is alert and oriented to person, place, and time.      Motor: No weakness.   Psychiatric:         Mood and Affect: Mood normal.         Behavior: Behavior is cooperative.         Thought Content: Thought content normal.         Cognition and Memory: She does not exhibit impaired recent memory.         Judgment: Judgment normal.         PELVIC EXAM:  VULVA:  normal appearing vulva with no masses, tenderness or lesions  VAGINA: normal appearing vagina with normal color and discharge, no lesions  CERVIX: normal appearing cervix without discharge or lesions, cervical discharge present - clear, cervical motion tenderness absent, multiparous os  UTERUS: uterus is normal size, shape, consistency and nontender  ADNEXA: normal adnexa in size, nontender and no masses  PAP: Pap smear done today, thin-prep method,exam chaperoned by  Nadia Pablo LPN      BREAST EXAM:  no suspicious masses, no skin or nipple changes or axillary nodes, symmetric fibrous changes in both upper outer quadrants      Assessment / Plan      Assessment/Plan:   Diagnoses and all orders for this visit:    1. Abnormal wellness exam (Primary)  -     Comprehensive Metabolic Panel; Future  -     TSH; Future  -     CBC & Differential; Future  -     POC Urinalysis Dipstick, Automated    2. Neuropathy  Continue gabapentin.      3. Bipolar 1 disorder (HCC)  Continue current medications.    4. Encounter for cervical Pap smear with pelvic exam  -     LIQUID-BASED PAP SMEAR, [facility] ([facility])    5. Encounter for hepatitis C screening test for low risk patient  -     Hepatitis C Antibody; Future    6. Encounter for screening mammogram for breast cancer  -     Mammo Screening Digital Tomosynthesis Bilateral With CAD; Future    7. Finger joint swelling, right  -     methylPREDNISolone (MEDROL) 4 MG dose pack; Take as directed on package instructions.  Dispense: 21 tablet; Refill: 0  -     Vitamin D,25-Hydroxy; Future    8. Encounter for colorectal cancer screening  -     Ambulatory Referral For Screening Colonoscopy    9. Encounter for lipid screening for cardiovascular disease  -     Lipid Panel; Future        Follow Up:   Return in about 3 months (around 1/27/2023) for Recheck.    Healthcare Maintenance:   Counseling provided on     Health Maintenance, Female  Adopting a healthy lifestyle and getting preventive  care can go a long way to promote health and wellness. Talk with your health care provider about what schedule of regular examinations is right for you. This is a good chance for you to check in with your provider about disease prevention and staying healthy.  In between checkups, there are plenty of things you can do on your own. Experts have done a lot of research about which lifestyle changes and preventive measures are most likely to keep you healthy. Ask your health care provider for more information.  Weight and diet  Eat a healthy diet  · Be sure to include plenty of vegetables, fruits, low-fat dairy products, and lean protein.  · Do not eat a lot of foods high in solid fats, added sugars, or salt.  · Get regular exercise. This is one of the most important things you can do for your health.  ? Most adults should exercise for at least 150 minutes each week. The exercise should increase your heart rate and make you sweat (moderate-intensity exercise).  ? Most adults should also do strengthening exercises at least twice a week. This is in addition to the moderate-intensity exercise.     Maintain a healthy weight  · Body mass index (BMI) is a measurement that can be used to identify possible weight problems. It estimates body fat based on height and weight. Your health care provider can help determine your BMI and help you achieve or maintain a healthy weight.  · For females 20 years of age and older:  ? A BMI below 18.5 is considered underweight.  ? A BMI of 18.5 to 24.9 is normal.  ? A BMI of 25 to 29.9 is considered overweight.  ? A BMI of 30 and above is considered obese.     Watch levels of cholesterol and blood lipids  · You should start having your blood tested for lipids and cholesterol at 20 years of age, then have this test every 5 years.  · You may need to have your cholesterol levels checked more often if:  ? Your lipid or cholesterol levels are high.  ? You are older than 50 years of age.  ? You are  at high risk for heart disease.     Cancer screening  Lung Cancer  · Lung cancer screening is recommended for adults 55-80 years old who are at high risk for lung cancer because of a history of smoking.  · A yearly low-dose CT scan of the lungs is recommended for people who:  ? Currently smoke.  ? Have quit within the past 15 years.  ? Have at least a 30-pack-year history of smoking. A pack year is smoking an average of one pack of cigarettes a day for 1 year.  · Yearly screening should continue until it has been 15 years since you quit.  · Yearly screening should stop if you develop a health problem that would prevent you from having lung cancer treatment.     Breast Cancer  · Practice breast self-awareness. This means understanding how your breasts normally appear and feel.  · It also means doing regular breast self-exams. Let your health care provider know about any changes, no matter how small.  · If you are in your 20s or 30s, you should have a clinical breast exam (CBE) by a health care provider every 1-3 years as part of a regular health exam.  · If you are 40 or older, have a CBE every year. Also consider having a breast X-ray (mammogram) every year.  · If you have a family history of breast cancer, talk to your health care provider about genetic screening.  · If you are at high risk for breast cancer, talk to your health care provider about having an MRI and a mammogram every year.  · Breast cancer gene (BRCA) assessment is recommended for women who have family members with BRCA-related cancers. BRCA-related cancers include:  ? Breast.  ? Ovarian.  ? Tubal.  ? Peritoneal cancers.  · Results of the assessment will determine the need for genetic counseling and BRCA1 and BRCA2 testing.     Cervical Cancer  Your health care provider may recommend that you be screened regularly for cancer of the pelvic organs (ovaries, uterus, and vagina). This screening involves a pelvic examination, including checking for  microscopic changes to the surface of your cervix (Pap test). You may be encouraged to have this screening done every 3 years, beginning at age 21.  · For women ages 30-65, health care providers may recommend pelvic exams and Pap testing every 3 years, or they may recommend the Pap and pelvic exam, combined with testing for human papilloma virus (HPV), every 5 years. Some types of HPV increase your risk of cervical cancer. Testing for HPV may also be done on women of any age with unclear Pap test results.  · Other health care providers may not recommend any screening for nonpregnant women who are considered low risk for pelvic cancer and who do not have symptoms. Ask your health care provider if a screening pelvic exam is right for you.  · If you have had past treatment for cervical cancer or a condition that could lead to cancer, you need Pap tests and screening for cancer for at least 20 years after your treatment. If Pap tests have been discontinued, your risk factors (such as having a new sexual partner) need to be reassessed to determine if screening should resume. Some women have medical problems that increase the chance of getting cervical cancer. In these cases, your health care provider may recommend more frequent screening and Pap tests.     Colorectal Cancer  · This type of cancer can be detected and often prevented.  · Routine colorectal cancer screening usually begins at 50 years of age and continues through 75 years of age.  · Your health care provider may recommend screening at an earlier age if you have risk factors for colon cancer.  · Your health care provider may also recommend using home test kits to check for hidden blood in the stool.  · A small camera at the end of a tube can be used to examine your colon directly (sigmoidoscopy or colonoscopy). This is done to check for the earliest forms of colorectal cancer.  · Routine screening usually begins at age 50.  · Direct examination of the colon  should be repeated every 5-10 years through 75 years of age. However, you may need to be screened more often if early forms of precancerous polyps or small growths are found.     Skin Cancer  · Check your skin from head to toe regularly.  · Tell your health care provider about any new moles or changes in moles, especially if there is a change in a mole's shape or color.  · Also tell your health care provider if you have a mole that is larger than the size of a pencil eraser.  · Always use sunscreen. Apply sunscreen liberally and repeatedly throughout the day.  · Protect yourself by wearing long sleeves, pants, a wide-brimmed hat, and sunglasses whenever you are outside.     Heart disease, diabetes, and high blood pressure  · High blood pressure causes heart disease and increases the risk of stroke. High blood pressure is more likely to develop in:  ? People who have blood pressure in the high end of the normal range (130-139/85-89 mm Hg).  ? People who are overweight or obese.  ? People who are .  · If you are 18-39 years of age, have your blood pressure checked every 3-5 years. If you are 40 years of age or older, have your blood pressure checked every year. You should have your blood pressure measured twice--once when you are at a hospital or clinic, and once when you are not at a hospital or clinic. Record the average of the two measurements. To check your blood pressure when you are not at a hospital or clinic, you can use:  ? An automated blood pressure machine at a pharmacy.  ? A home blood pressure monitor.  · If you are between 55 years and 79 years old, ask your health care provider if you should take aspirin to prevent strokes.  · Have regular diabetes screenings. This involves taking a blood sample to check your fasting blood sugar level.  ? If you are at a normal weight and have a low risk for diabetes, have this test once every three years after 45 years of age.  ? If you are overweight  and have a high risk for diabetes, consider being tested at a younger age or more often.  Preventing infection  Hepatitis B  · If you have a higher risk for hepatitis B, you should be screened for this virus. You are considered at high risk for hepatitis B if:  ? You were born in a country where hepatitis B is common. Ask your health care provider which countries are considered high risk.  ? Your parents were born in a high-risk country, and you have not been immunized against hepatitis B (hepatitis B vaccine).  ? You have HIV or AIDS.  ? You use needles to inject street drugs.  ? You live with someone who has hepatitis B.  ? You have had sex with someone who has hepatitis B.  ? You get hemodialysis treatment.  ? You take certain medicines for conditions, including cancer, organ transplantation, and autoimmune conditions.     Hepatitis C  · Blood testing is recommended for:  ? Everyone born from 1945 through 1965.  ? Anyone with known risk factors for hepatitis C.     Sexually transmitted infections (STIs)  · You should be screened for sexually transmitted infections (STIs) including gonorrhea and chlamydia if:  ? You are sexually active and are younger than 24 years of age.  ? You are older than 24 years of age and your health care provider tells you that you are at risk for this type of infection.  ? Your sexual activity has changed since you were last screened and you are at an increased risk for chlamydia or gonorrhea. Ask your health care provider if you are at risk.  · If you do not have HIV, but are at risk, it may be recommended that you take a prescription medicine daily to prevent HIV infection. This is called pre-exposure prophylaxis (PrEP). You are considered at risk if:  ? You are sexually active and do not regularly use condoms or know the HIV status of your partner(s).  ? You take drugs by injection.  ? You are sexually active with a partner who has HIV.     Talk with your health care provider about  whether you are at high risk of being infected with HIV. If you choose to begin PrEP, you should first be tested for HIV. You should then be tested every 3 months for as long as you are taking PrEP.  Pregnancy  · If you are premenopausal and you may become pregnant, ask your health care provider about preconception counseling.  · If you may become pregnant, take 400 to 800 micrograms (mcg) of folic acid every day.  · If you want to prevent pregnancy, talk to your health care provider about birth control (contraception).  Osteoporosis and menopause  · Osteoporosis is a disease in which the bones lose minerals and strength with aging. This can result in serious bone fractures. Your risk for osteoporosis can be identified using a bone density scan.  · If you are 65 years of age or older, or if you are at risk for osteoporosis and fractures, ask your health care provider if you should be screened.  · Ask your health care provider whether you should take a calcium or vitamin D supplement to lower your risk for osteoporosis.  · Menopause may have certain physical symptoms and risks.  · Hormone replacement therapy may reduce some of these symptoms and risks.  Talk to your health care provider about whether hormone replacement therapy is right for you.  Follow these instructions at home:  · Schedule regular health, dental, and eye exams.  · Stay current with your immunizations.  · Do not use any tobacco products including cigarettes, chewing tobacco, or electronic cigarettes.  · If you are pregnant, do not drink alcohol.  · If you are breastfeeding, limit how much and how often you drink alcohol.  · Limit alcohol intake to no more than 1 drink per day for nonpregnant women. One drink equals 12 ounces of beer, 5 ounces of wine, or 1½ ounces of hard liquor.  · Do not use street drugs.  · Do not share needles.  · Ask your health care provider for help if you need support or information about quitting drugs.  · Tell your health  care provider if you often feel depressed.  · Tell your health care provider if you have ever been abused or do not feel safe at home.  This information is not intended to replace advice given to you by your health care provider. Make sure you discuss any questions you have with your health care provider.  Document Released: 07/02/2012 Document Revised: 05/25/2017 Document Reviewed: 09/20/2016  [employer] Interactive Patient Education © 2018 [employer]  Xiomara Taylor voices understanding and acceptance of this advice and will call back with any further questions or concerns. AVS with preventive healthcare tips printed for patient.     VANESSA Teixeira  [facility]

## 2022-10-31 DIAGNOSIS — G62.9 NEUROPATHY: ICD-10-CM

## 2022-11-01 LAB — REF LAB TEST METHOD: NORMAL

## 2022-11-03 RX ORDER — GABAPENTIN 300 MG/1
CAPSULE ORAL
Qty: 90 CAPSULE | Refills: 2 | Status: SHIPPED | OUTPATIENT
Start: 2022-11-03 | End: 2023-02-19

## 2022-12-09 RX ORDER — ALBUTEROL SULFATE 90 UG/1
2 AEROSOL, METERED RESPIRATORY (INHALATION) EVERY 4 HOURS PRN
Qty: 8 G | Refills: 3 | Status: SHIPPED | OUTPATIENT
Start: 2022-12-09

## 2023-01-10 RX ORDER — SODIUM, POTASSIUM,MAG SULFATES 17.5-3.13G
1 SOLUTION, RECONSTITUTED, ORAL ORAL TAKE AS DIRECTED
Qty: 354 ML | Refills: 0 | Status: SHIPPED | OUTPATIENT
Start: 2023-01-10

## 2023-01-16 ENCOUNTER — OFFICE VISIT (OUTPATIENT)
Dept: INTERNAL MEDICINE | Facility: CLINIC | Age: 53
End: 2023-01-16
Payer: COMMERCIAL

## 2023-01-16 VITALS
SYSTOLIC BLOOD PRESSURE: 120 MMHG | DIASTOLIC BLOOD PRESSURE: 70 MMHG | RESPIRATION RATE: 16 BRPM | OXYGEN SATURATION: 98 % | HEART RATE: 73 BPM | BODY MASS INDEX: 23.59 KG/M2 | HEIGHT: 66 IN | WEIGHT: 146.8 LBS | TEMPERATURE: 98.2 F

## 2023-01-16 DIAGNOSIS — G62.9 NEUROPATHY: Primary | ICD-10-CM

## 2023-01-16 DIAGNOSIS — M15.9 OSTEOARTHRITIS OF MULTIPLE JOINTS, UNSPECIFIED OSTEOARTHRITIS TYPE: ICD-10-CM

## 2023-01-16 DIAGNOSIS — D22.9 ATYPICAL MOLE: ICD-10-CM

## 2023-01-16 PROCEDURE — 99213 OFFICE O/P EST LOW 20 MIN: CPT | Performed by: NURSE PRACTITIONER

## 2023-01-16 NOTE — PROGRESS NOTES
"Patient Name: Xiomara Taylor  : 1970   MRN: [id number]     Chief Complaint:    Chief Complaint   Patient presents with   • Peripheral Neuropathy     Follow up       History of Present Illness: Xiomara Taylor is a 52 y.o. female presents to clinic for 3-month followup.    The patient reports increased lower back and neck pain, which she attributes to prior neck surgical intervention and working 50 hours per week. She takes meloxicam for lower back and neck pain. She denies bleeding and abdominal pain.    The patient takes gabapentin, which she confirms is helpful for treating her neck pain and right hand neuropathy. She notes that gabapentin allows her right hand to be \"about 40 percent alive.\" The patient reports that she experiences right hand pain if she holds gabapentin. She denies drowsiness or any other side effects with gabapentin.     The patient states that she is doing well with taking Seroquel for insomnia.     She reports two facial moles which have changed and become \"rougher\" and \"more raised.\" She notes that she stopped applying lotion to the area for some time, though she recently restarted lotion application. The patient states that dermatology evaluated the moles  in the past when the moles were smaller, and dermatology was unconcerned at that time.     The patient is scheduled to undergo colonoscopy on 2023.        Subjective     Review of System: Review of Systems   Musculoskeletal: Positive for back pain and neck pain.   Skin:        Positive for facial moles.   Neurological: Positive for numbness.        Medications:     Current Outpatient Medications:   •  albuterol sulfate  (90 Base) MCG/ACT inhaler, Inhale 2 puffs Every 4 (Four) Hours As Needed for Wheezing., Disp: 8 g, Rfl: 3  •  CBD (cannabidiol) oral oil, cannabidiol (CBD) oral oil  Take by oral route., Disp: , Rfl:   •  fexofenadine-pseudoephedrine (ALLEGRA-D 24) 180-240 MG per 24 hr tablet, Take 1 tablet by mouth " "Daily., Disp: , Rfl:   •  gabapentin (NEURONTIN) 300 MG capsule, TAKE ONE CAPSULE BY MOUTH THREE TIMES A DAY, Disp: 90 capsule, Rfl: 2  •  lamoTRIgine (LaMICtal) 200 MG tablet, , Disp: , Rfl:   •  meloxicam (MOBIC) 15 MG tablet, , Disp: , Rfl:   •  propranolol (INDERAL) 10 MG tablet, Take 10 mg by mouth 2 (Two) Times a Day., Disp: , Rfl:   •  QUEtiapine (SEROquel) 100 MG tablet, , Disp: , Rfl:   •  sodium-potassium-magnesium sulfates (Suprep Bowel Prep Kit) 17.5-3.13-1.6 GM/177ML solution oral solution, Take 1 bottle by mouth Take As Directed. Follow instructions that were mailed to your home. If you didn't receive these call [phone]., Disp: 354 mL, Rfl: 0    Allergies:   Allergies   Allergen Reactions   • Augmentin [Amoxicillin-Pot Clavulanate] Hives   • Diflucan [Fluconazole] Hives       Objective     Physical Exam:   Vital Signs:   Vitals:    01/16/23 0956   BP: 120/70   BP Location: Right arm   Patient Position: Sitting   Cuff Size: Adult   Pulse: 73   Resp: 16   Temp: 98.2 °F (36.8 °C)   TempSrc: Infrared   SpO2: 98%   Weight: 66.6 kg (146 lb 12.8 oz)   Height: 167.6 cm (66\")   PainSc:   3     Body mass index is 23.69 kg/m². BMI is within normal parameters. No other follow-up for BMI required.      Physical Exam  Constitutional:       General: She is not in acute distress.     Appearance: She is not ill-appearing.   HENT:      Head: Normocephalic.   Cardiovascular:      Rate and Rhythm: Normal rate and regular rhythm.      Heart sounds: Normal heart sounds. No murmur heard.  Pulmonary:      Breath sounds: Normal breath sounds.   Abdominal:      General: Bowel sounds are normal.      Tenderness: There is no abdominal tenderness.   Skin:     Comments: Right and left facial  irregular mole   Neurological:      General: No focal deficit present.      Mental Status: She is oriented to person, place, and time.   Psychiatric:         Mood and Affect: Mood normal.         Assessment / Plan      Assessment/Plan: "   Diagnoses and all orders for this visit:    1. Neuropathy (Primary)    2. Atypical mole    3. Osteoarthritis of multiple joints, unspecified osteoarthritis type    1. Right hand neuropathy. Neck and Back pain/osteoarthritis.   She tolerates the gabapentin well and improves quality of life.   - Gabapentin refill will be due on 02/01/2023.    3. Atypical mole  - She was advised to undergo reevaluation with dermatology. She will call to schedule an appointment and let me know if she needs a referral.         Follow Up:   Return in about 3 months (around 4/16/2023) for Recheck.    VANESSA Teixeira  [facility]    Transcribed from ambient dictation for VANESSA Teixeira by Hayley Marsh.  01/16/23   12:01 EST    Patient or patient representative verbalized consent to the visit recording.  I have personally performed the services described in this document as transcribed by the above individual, and it is both accurate and complete.

## 2023-01-18 ENCOUNTER — OUTSIDE FACILITY SERVICE (OUTPATIENT)
Dept: GASTROENTEROLOGY | Facility: CLINIC | Age: 53
End: 2023-01-18
Payer: COMMERCIAL

## 2023-01-18 PROCEDURE — 45378 DIAGNOSTIC COLONOSCOPY: CPT | Performed by: INTERNAL MEDICINE

## 2023-02-14 ENCOUNTER — TELEPHONE (OUTPATIENT)
Dept: INTERNAL MEDICINE | Facility: CLINIC | Age: 53
End: 2023-02-14
Payer: COMMERCIAL

## 2023-02-14 NOTE — TELEPHONE ENCOUNTER
Patient dropped off [facility] paperwork for completion. Informed patient of $25 form fee due at the time of . Patient requested call when form is ready.

## 2023-02-17 DIAGNOSIS — G62.9 NEUROPATHY: ICD-10-CM

## 2023-02-19 RX ORDER — GABAPENTIN 300 MG/1
CAPSULE ORAL
Qty: 90 CAPSULE | Refills: 2 | Status: SHIPPED | OUTPATIENT
Start: 2023-02-19

## 2023-04-19 ENCOUNTER — OFFICE VISIT (OUTPATIENT)
Dept: INTERNAL MEDICINE | Facility: CLINIC | Age: 53
End: 2023-04-19
Payer: COMMERCIAL

## 2023-04-19 VITALS
OXYGEN SATURATION: 99 % | RESPIRATION RATE: 16 BRPM | DIASTOLIC BLOOD PRESSURE: 82 MMHG | WEIGHT: 144.8 LBS | HEART RATE: 84 BPM | HEIGHT: 66 IN | TEMPERATURE: 97.8 F | SYSTOLIC BLOOD PRESSURE: 126 MMHG | BODY MASS INDEX: 23.27 KG/M2

## 2023-04-19 DIAGNOSIS — F31.9 BIPOLAR 1 DISORDER: ICD-10-CM

## 2023-04-19 DIAGNOSIS — Z79.899 ENCOUNTER FOR MEDICATION MANAGEMENT: ICD-10-CM

## 2023-04-19 DIAGNOSIS — G62.9 NEUROPATHY: Primary | ICD-10-CM

## 2023-04-19 DIAGNOSIS — Z72.0 TOBACCO USE: ICD-10-CM

## 2023-04-19 LAB
ALBUMIN SERPL-MCNC: 4.3 G/DL (ref 3.5–5.2)
ALBUMIN/GLOB SERPL: 2 G/DL
ALP SERPL-CCNC: 82 U/L (ref 39–117)
ALT SERPL W P-5'-P-CCNC: 8 U/L (ref 1–33)
ANION GAP SERPL CALCULATED.3IONS-SCNC: 9 MMOL/L (ref 5–15)
AST SERPL-CCNC: 17 U/L (ref 1–32)
BASOPHILS # BLD AUTO: 0.06 10*3/MM3 (ref 0–0.2)
BASOPHILS NFR BLD AUTO: 0.7 % (ref 0–1.5)
BILIRUB SERPL-MCNC: 0.3 MG/DL (ref 0–1.2)
BUN SERPL-MCNC: 14 MG/DL (ref 6–20)
BUN/CREAT SERPL: 16.7 (ref 7–25)
CALCIUM SPEC-SCNC: 9.2 MG/DL (ref 8.6–10.5)
CHLORIDE SERPL-SCNC: 108 MMOL/L (ref 98–107)
CO2 SERPL-SCNC: 29 MMOL/L (ref 22–29)
CREAT SERPL-MCNC: 0.84 MG/DL (ref 0.57–1)
DEPRECATED RDW RBC AUTO: 40.3 FL (ref 37–54)
EGFRCR SERPLBLD CKD-EPI 2021: 84.3 ML/MIN/1.73
EOSINOPHIL # BLD AUTO: 0.11 10*3/MM3 (ref 0–0.4)
EOSINOPHIL NFR BLD AUTO: 1.4 % (ref 0.3–6.2)
ERYTHROCYTE [DISTWIDTH] IN BLOOD BY AUTOMATED COUNT: 12.4 % (ref 12.3–15.4)
GLOBULIN UR ELPH-MCNC: 2.2 GM/DL
GLUCOSE SERPL-MCNC: 82 MG/DL (ref 65–99)
HCT VFR BLD AUTO: 43.2 % (ref 34–46.6)
HGB BLD-MCNC: 14.7 G/DL (ref 12–15.9)
IMM GRANULOCYTES # BLD AUTO: 0.02 10*3/MM3 (ref 0–0.05)
IMM GRANULOCYTES NFR BLD AUTO: 0.2 % (ref 0–0.5)
LYMPHOCYTES # BLD AUTO: 1.94 10*3/MM3 (ref 0.7–3.1)
LYMPHOCYTES NFR BLD AUTO: 24 % (ref 19.6–45.3)
MCH RBC QN AUTO: 30.4 PG (ref 26.6–33)
MCHC RBC AUTO-ENTMCNC: 34 G/DL (ref 31.5–35.7)
MCV RBC AUTO: 89.4 FL (ref 79–97)
MONOCYTES # BLD AUTO: 0.46 10*3/MM3 (ref 0.1–0.9)
MONOCYTES NFR BLD AUTO: 5.7 % (ref 5–12)
NEUTROPHILS NFR BLD AUTO: 5.48 10*3/MM3 (ref 1.7–7)
NEUTROPHILS NFR BLD AUTO: 68 % (ref 42.7–76)
NRBC BLD AUTO-RTO: 0 /100 WBC (ref 0–0.2)
PLATELET # BLD AUTO: 266 10*3/MM3 (ref 140–450)
PMV BLD AUTO: 8.9 FL (ref 6–12)
POTASSIUM SERPL-SCNC: 5.2 MMOL/L (ref 3.5–5.2)
PROT SERPL-MCNC: 6.5 G/DL (ref 6–8.5)
RBC # BLD AUTO: 4.83 10*6/MM3 (ref 3.77–5.28)
SODIUM SERPL-SCNC: 146 MMOL/L (ref 136–145)
TSH SERPL DL<=0.05 MIU/L-ACNC: 0.99 UIU/ML (ref 0.27–4.2)
WBC NRBC COR # BLD: 8.07 10*3/MM3 (ref 3.4–10.8)

## 2023-04-19 PROCEDURE — 80050 GENERAL HEALTH PANEL: CPT | Performed by: NURSE PRACTITIONER

## 2023-04-19 RX ORDER — NICOTINE 21 MG/24HR
1 PATCH, TRANSDERMAL 24 HOURS TRANSDERMAL EVERY 24 HOURS
Qty: 28 PATCH | Refills: 2 | Status: SHIPPED | OUTPATIENT
Start: 2023-04-19

## 2023-04-19 NOTE — PATIENT INSTRUCTIONS
Steps to Quit Smoking  Smoking tobacco is the leading cause of preventable death. It can affect almost every organ in the body. Smoking puts you and those around you at risk for developing many serious chronic diseases. Quitting smoking can be difficult, but it is one of the best things that you can do for your health. It is never too late to quit.  How do I get ready to quit?  When you decide to quit smoking, create a plan to help you succeed. Before you quit:  • Pick a date to quit. Set a date within the next 2 weeks to give you time to prepare.  • Write down the reasons why you are quitting. Keep this list in places where you will see it often.  • Tell your family, friends, and co-workers that you are quitting. Support from your loved ones can make quitting easier.  • Talk with your health care provider about your options for quitting smoking.  • Find out what treatment options are covered by your health insurance.  • Identify people, places, things, and activities that make you want to smoke (triggers). Avoid them.  What first steps can I take to quit smoking?  • Throw away all cigarettes at home, at work, and in your car.  • Throw away smoking accessories, such as ashtrays and lighters.  • Clean your car. Make sure to empty the ashtray.  • Clean your home, including curtains and carpets.  What strategies can I use to quit smoking?  Talk with your health care provider about combining strategies, such as taking medicines while you are also receiving in-person counseling. Using these two strategies together makes you more likely to succeed in quitting than if you used either strategy on its own.  • If you are pregnant or breastfeeding, talk with your health care provider about finding counseling or other support strategies to quit smoking. Do not take medicine to help you quit smoking unless your health care provider tells you to do so.  To quit smoking:  Quit right away  • Quit smoking completely, instead of  gradually reducing how much you smoke over a period of time. Research shows that stopping smoking right away is more successful than gradually quitting.  • Attend in-person counseling to help you build problem-solving skills. You are more likely to succeed in quitting if you attend counseling sessions regularly. Even short sessions of 10 minutes can be effective.  Take medicine  You may take medicines to help you quit smoking. Some medicines require a prescription and some you can purchase over-the-counter. Medicines may have nicotine in them to replace the nicotine in cigarettes. Medicines may:  • Help to stop cravings.  • Help to relieve withdrawal symptoms.  Your health care provider may recommend:  • Nicotine patches, gum, or lozenges.  • Nicotine inhalers or sprays.  • Non-nicotine medicine that is taken by mouth.  Find resources  Find resources and support systems that can help you to quit smoking and remain smoke-free after you quit. These resources are most helpful when you use them often. They include:  • Online chats with a counselor.  • Telephone quitlines.  • Printed self-help materials.  • Support groups or group counseling.  • Text messaging programs.  • Mobile phone apps or applications. Use apps that can help you stick to your quit plan by providing reminders, tips, and encouragement. There are many free apps for mobile devices as well as websites. Examples include Quit Guide from the CDC and smokefree.gov  What things can I do to make it easier to quit?    • Reach out to your family and friends for support and encouragement. Call telephone quitlines (7-136-QUIT-NOW), reach out to support groups, or work with a counselor for support.  • Ask people who smoke to avoid smoking around you.  • Avoid places that trigger you to smoke, such as bars, parties, or smoke-break areas at work.  • Spend time with people who do not smoke.  • Lessen the stress in your life. Stress can be a smoking trigger for some  people. To lessen stress, try:  ? Exercising regularly.  ? Doing deep-breathing exercises.  ? Doing yoga.  ? Meditating.  ? Performing a body scan. This involves closing your eyes, scanning your body from head to toe, and noticing which parts of your body are particularly tense. Try to relax the muscles in those areas.  How will I feel when I quit smoking?  Day 1 to 3 weeks  Within the first 24 hours of quitting smoking, you may start to feel withdrawal symptoms. These symptoms are usually most noticeable 2-3 days after quitting, but they usually do not last for more than 2-3 weeks. You may experience these symptoms:  • Mood swings.  • Restlessness, anxiety, or irritability.  • Trouble concentrating.  • Dizziness.  • Strong cravings for sugary foods and nicotine.  • Mild weight gain.  • Constipation.  • Nausea.  • Coughing or a sore throat.  • Changes in how the medicines that you take for unrelated issues work in your body.  • Depression.  • Trouble sleeping (insomnia).  Week 3 and afterward  After the first 2-3 weeks of quitting, you may start to notice more positive results, such as:  • Improved sense of smell and taste.  • Decreased coughing and sore throat.  • Slower heart rate.  • Lower blood pressure.  • Clearer skin.  • The ability to breathe more easily.  • Fewer sick days.  Quitting smoking can be very challenging. Do not get discouraged if you are not successful the first time. Some people need to make many attempts to quit before they achieve long-term success. Do your best to stick to your quit plan, and talk with your health care provider if you have any questions or concerns.  Summary  • Smoking tobacco is the leading cause of preventable death. Quitting smoking is one of the best things that you can do for your health.  • When you decide to quit smoking, create a plan to help you succeed.  • Quit smoking right away, not slowly over a period of time.  • When you start quitting, seek help from your  health care provider, family, or friends.  This information is not intended to replace advice given to you by your health care provider. Make sure you discuss any questions you have with your health care provider.  Document Revised: 08/26/2022 Document Reviewed: 03/07/2020  Elsevier Patient Education © 2022 [employer] Inc[employer]

## 2023-04-19 NOTE — PROGRESS NOTES
Patient Name: Xiomara Taylor  : 1970   MRN: [id number]     Chief Complaint:    Chief Complaint   Patient presents with   • Peripheral Neuropathy     Follow up       History of Present Illness:   Xiomara Taylor is a 52-year-old female who presents today for follow-up.     The patient states her back has been bothering her. She went to the chiropractor 2 times last week. Her right leg was 1.5 inches longer than left.     She states she had her mole evaluated and she had one frozen.     The patient states she is still smoking. She started playing basketball again 2 weeks ago and has noticed she is out of breath easily. She is interested in smoking cessation. In the past, she has tried nicotine gum and lozenges.     She states gabapentin has been working well for her neuropathy. She states she can notice if she misses her mid-day dose. Her right hand is worse than her left.     Bipolar disorder and anxiety are controlled on seroquel 100 mg daily and lamotrigine 200 mg. She takes propranolol as needed for anxiety.     Subjective     Review of System: Review of Systems   Musculoskeletal: Positive for arthralgias and back pain.   Neurological: Positive for numbness.        Medications:     Current Outpatient Medications:   •  albuterol sulfate  (90 Base) MCG/ACT inhaler, Inhale 2 puffs Every 4 (Four) Hours As Needed for Wheezing., Disp: 8 g, Rfl: 3  •  CBD (cannabidiol) oral oil, cannabidiol (CBD) oral oil  Take by oral route., Disp: , Rfl:   •  fexofenadine-pseudoephedrine (ALLEGRA-D 24) 180-240 MG per 24 hr tablet, Take 1 tablet by mouth Daily., Disp: , Rfl:   •  gabapentin (NEURONTIN) 300 MG capsule, TAKE ONE CAPSULE BY MOUTH THREE TIMES A DAY, Disp: 90 capsule, Rfl: 2  •  lamoTRIgine (LaMICtal) 200 MG tablet, , Disp: , Rfl:   •  meloxicam (MOBIC) 15 MG tablet, , Disp: , Rfl:   •  propranolol (INDERAL) 10 MG tablet, Take 1 tablet by mouth 2 (Two) Times a Day., Disp: , Rfl:   •  QUEtiapine (SEROquel) 100 MG  "tablet, , Disp: , Rfl:   •  nicotine (Nicoderm CQ) 21 MG/24HR patch, Place 1 patch on the skin as directed by provider Daily., Disp: 28 patch, Rfl: 2    Allergies:   Allergies   Allergen Reactions   • Augmentin [Amoxicillin-Pot Clavulanate] Hives   • Diflucan [Fluconazole] Hives       Objective     Physical Exam:   Vital Signs:   Vitals:    04/19/23 0811   BP: 126/82   BP Location: Right arm   Patient Position: Sitting   Cuff Size: Adult   Pulse: 84   Resp: 16   Temp: 97.8 °F (36.6 °C)   TempSrc: Infrared   SpO2: 99%   Weight: 65.7 kg (144 lb 12.8 oz)   Height: 167.6 cm (66\")   PainSc:   5     Body mass index is 23.37 kg/m². BMI is within normal parameters. No other follow-up for BMI required.      Physical Exam  Constitutional:       General: She is not in acute distress.     Appearance: She is not ill-appearing.   HENT:      Head: Normocephalic.   Cardiovascular:      Rate and Rhythm: Normal rate and regular rhythm.      Heart sounds: Normal heart sounds. No murmur heard.  Pulmonary:      Effort: Pulmonary effort is normal.      Breath sounds: Normal breath sounds.   Abdominal:      General: Bowel sounds are normal.      Tenderness: There is no abdominal tenderness.   Neurological:      General: No focal deficit present.      Mental Status: She is oriented to person, place, and time.   Psychiatric:         Mood and Affect: Mood normal.         Assessment / Plan      Assessment/Plan:   Diagnoses and all orders for this visit:    1. Neuropathy (Primary)    2. Encounter for medication management  -     Comprehensive Metabolic Panel; Future  -     TSH; Future  -     CBC & Differential; Future  -     Comprehensive Metabolic Panel  -     TSH  -     CBC & Differential    3. Bipolar 1 disorder  -     Comprehensive Metabolic Panel; Future  -     TSH; Future  -     CBC & Differential; Future  -     Comprehensive Metabolic Panel  -     TSH  -     CBC & Differential    4. Tobacco use  -     nicotine (Nicoderm CQ) 21 MG/24HR " patch; Place 1 patch on the skin as directed by provider Daily.  Dispense: 28 patch; Refill: 2       Xiomara Taylor  reports that she has been smoking cigarettes. She has a 0.25 pack-year smoking history. She has never used smokeless tobacco.. I have educated her on the risk of diseases from using tobacco products such as cancer, COPD and heart disease.     I advised her to quit and she is willing to quit. We have discussed the following method/s for tobacco cessation:  Education Material Prescription Medicaiton.  Together we have set a quit date for 2 weeks from today.  She will follow up with me in 3 months or sooner to check on her progress.    I spent 4 minutes counseling the patient.       1. Encounter for medication management   - Labs will be obtained today as discussed.     2. Neuropathy   - She is tolerating gabapentin well. We will continue gabapentin 300 mg TID.  She will follow up in 07/2023 for CSA renewal.     3. Smoking cessation   - Prescription for nicotine patches was sent to the patient's preferred pharmacy. She set a goal to start using the patches on 05/01/2023.     Follow Up:   Return in about 3 months (around 7/19/2023) for Recheck.    VANESSA Teixeira  [facility]      Transcribed from ambient dictation for VANESSA Teixeira by Yi Harris, Quality .  04/19/23   10:45 EDT    Patient or patient representative verbalized consent to the visit recording.  I have personally performed the services described in this document as transcribed by the above individual, and it is both accurate and complete.

## 2023-05-10 ENCOUNTER — TRANSCRIBE ORDERS (OUTPATIENT)
Dept: ADMINISTRATIVE | Facility: HOSPITAL | Age: 53
End: 2023-05-10
Payer: COMMERCIAL

## 2023-05-10 DIAGNOSIS — Z12.31 SCREENING MAMMOGRAM FOR BREAST CANCER: Primary | ICD-10-CM

## 2023-05-31 ENCOUNTER — HOSPITAL ENCOUNTER (OUTPATIENT)
Dept: GENERAL RADIOLOGY | Facility: HOSPITAL | Age: 53
Discharge: HOME | End: 2023-05-31
Admitting: NURSE PRACTITIONER

## 2023-05-31 ENCOUNTER — OFFICE VISIT (OUTPATIENT)
Dept: INTERNAL MEDICINE | Facility: CLINIC | Age: 53
End: 2023-05-31

## 2023-05-31 DIAGNOSIS — R06.02 SHORTNESS OF BREATH: Primary | ICD-10-CM

## 2023-05-31 DIAGNOSIS — R06.02 SHORTNESS OF BREATH: ICD-10-CM

## 2023-05-31 DIAGNOSIS — J40 BRONCHITIS: Primary | ICD-10-CM

## 2023-05-31 PROCEDURE — 71046 X-RAY EXAM CHEST 2 VIEWS: CPT

## 2023-05-31 PROCEDURE — 99213 OFFICE O/P EST LOW 20 MIN: CPT | Performed by: NURSE PRACTITIONER

## 2023-05-31 RX ORDER — MELOXICAM 15 MG/1
15 TABLET ORAL DAILY
Qty: 90 TABLET | Refills: 1 | Status: SHIPPED | OUTPATIENT
Start: 2023-05-31 | End: 2023-06-01 | Stop reason: SDUPTHER

## 2023-05-31 NOTE — PROGRESS NOTES
Patient Name: Xiomara Taylor  : 1970   MRN: [id number]     Chief Complaint:    Chief Complaint   Patient presents with   • Cough       History of Present Illness: Xiomara Taylor is a 52 y.o. female presents to clinic for cough.    The patient states she started to experience her symptoms on 2023 taking Sudafed and Advil with no relief. She adds on 2023 she went to urgent care. She adds she was informed she has a sinus infection, and was given steroids, and antibiotics. She notes on 2023 she begins to experience rattling in chest, cough, chills, fever, sore throat, sinus pain, congestion, rhinorrhea, and clogged ears. She adds she is having trouble swallowing and bumps on her tongue. She denies having her throat swabbed in urgent care.     The patient states she uses an inhaler; however, she notes she does not have relief with the inhaler. She reports she does not have a nebulizer at home.      She states she discontinued smoking.     Subjective     Review of System: Review of Systems   A review of systems was performed, and the pertinent positives are noted in the HPI.   Medications:     Current Outpatient Medications:   •  albuterol sulfate  (90 Base) MCG/ACT inhaler, Inhale 2 puffs Every 4 (Four) Hours As Needed for Wheezing., Disp: 8 g, Rfl: 3  •  CBD (cannabidiol) oral oil, cannabidiol (CBD) oral oil  Take by oral route., Disp: , Rfl:   •  Chlorcyclizine-Pseudoephed (Stahist AD) 25-60 MG tablet, Take 1 tablet by mouth 2 (Two) Times a Day for 10 days., Disp: 20 tablet, Rfl: 0  •  doxycycline (MONODOX) 100 MG capsule, Take 1 capsule by mouth 2 (Two) Times a Day for 10 days., Disp: 20 capsule, Rfl: 0  •  fexofenadine-pseudoephedrine (ALLEGRA-D 24) 180-240 MG per 24 hr tablet, Take 1 tablet by mouth Daily., Disp: , Rfl:   •  gabapentin (NEURONTIN) 300 MG capsule, TAKE ONE CAPSULE BY MOUTH THREE TIMES A DAY, Disp: 90 capsule, Rfl: 2  •  lamoTRIgine (LaMICtal) 200 MG tablet, , Disp: ,  "Rfl:   •  nicotine (Nicoderm CQ) 21 MG/24HR patch, Place 1 patch on the skin as directed by provider Daily., Disp: 28 patch, Rfl: 2  •  promethazine-dextromethorphan (PROMETHAZINE-DM) 6.25-15 MG/5ML syrup, Take 5 mL by mouth 4 (Four) Times a Day As Needed for Cough for up to 10 days., Disp: 240 mL, Rfl: 0  •  propranolol (INDERAL) 10 MG tablet, Take 1 tablet by mouth 2 (Two) Times a Day., Disp: , Rfl:   •  QUEtiapine (SEROquel) 100 MG tablet, , Disp: , Rfl:   •  albuterol (PROVENTIL) (2.5 MG/3ML) 0.083% nebulizer solution, Take 2.5 mg by nebulization Every 4 (Four) Hours As Needed for Wheezing., Disp: 90 each, Rfl: 11  •  meloxicam (MOBIC) 15 MG tablet, Take 1 tablet by mouth Daily., Disp: 90 tablet, Rfl: 1  •  predniSONE (DELTASONE) 20 MG tablet, Take 2 tablets by mouth Daily., Disp: 10 tablet, Rfl: 0    Allergies:   Allergies   Allergen Reactions   • Augmentin [Amoxicillin-Pot Clavulanate] Hives   • Diflucan [Fluconazole] Hives       Objective     Physical Exam:   Vital Signs:   Vitals:    05/31/23 1323 05/31/23 1330   BP: 150/80    BP Location: Right arm    Patient Position: Sitting    Cuff Size: Adult    Pulse: 68    Resp: 22    Temp: 98.2 °F (36.8 °C)    TempSrc: Infrared    SpO2:  97%   Weight: 63.3 kg (139 lb 9.6 oz)    Height: 170.2 cm (67\")    PainSc:   8      Body mass index is 21.86 kg/m². BMI is within normal parameters. No other follow-up for BMI required.      Physical Exam  Pulmonary:      Breath sounds: Wheezing present.         Assessment / Plan      Assessment/Plan:   Diagnoses and all orders for this visit:    1. Bronchitis (Primary)         Bronchitis   - Patient was given albuterol nebulizer treatment in her visit today.  Patient states her breathing has improved.  She has improved breath sounds and decreased wheezing.  Oxygen saturations 97%.  -Submitted a referral for chest x-ray.   -She will continue her medications, including doxycycline.  -Prednisone 40 mg daily sent into her " pharmacy    The patient was given albuterol nebulizer with improvement. She will continue the albuterol nebulizer every 4 hours. The patient will continue her doxycycline. Prescribed the patient prednisone for 5 days. She will complete a chest x-ray and will let the patient know if there are any abnormal results. Encouraged the patient if her symptoms do not improve or become severe to go to the emergency room.     Follow Up:   VANESSA Dumont  [facility]    Transcribed from ambient dictation for VANESSA Teixeira by Humberto Jimenez.  05/31/23   15:17 EDT    Patient or patient representative verbalized consent to the visit recording.  I have personally performed the services described in this document as transcribed by the above individual, and it is both accurate and complete.

## 2023-06-01 ENCOUNTER — TELEPHONE (OUTPATIENT)
Dept: INTERNAL MEDICINE | Facility: CLINIC | Age: 53
End: 2023-06-01

## 2023-06-01 VITALS
SYSTOLIC BLOOD PRESSURE: 150 MMHG | RESPIRATION RATE: 22 BRPM | OXYGEN SATURATION: 97 % | TEMPERATURE: 98.2 F | WEIGHT: 139.6 LBS | DIASTOLIC BLOOD PRESSURE: 80 MMHG | BODY MASS INDEX: 21.91 KG/M2 | HEIGHT: 67 IN | HEART RATE: 68 BPM

## 2023-06-01 DIAGNOSIS — R06.2 WHEEZING: Primary | ICD-10-CM

## 2023-06-01 DIAGNOSIS — G62.9 NEUROPATHY: ICD-10-CM

## 2023-06-01 DIAGNOSIS — R06.2 WHEEZING: ICD-10-CM

## 2023-06-01 DIAGNOSIS — J40 BRONCHITIS: Primary | ICD-10-CM

## 2023-06-01 RX ORDER — PREDNISONE 20 MG/1
40 TABLET ORAL DAILY
Qty: 10 TABLET | Refills: 0 | Status: SHIPPED | OUTPATIENT
Start: 2023-06-01 | End: 2023-06-07

## 2023-06-01 RX ORDER — ALBUTEROL SULFATE 2.5 MG/3ML
2.5 SOLUTION RESPIRATORY (INHALATION) EVERY 4 HOURS PRN
Qty: 90 EACH | Refills: 11 | Status: SHIPPED | OUTPATIENT
Start: 2023-06-01

## 2023-06-01 RX ORDER — MELOXICAM 15 MG/1
15 TABLET ORAL DAILY
Qty: 90 TABLET | Refills: 1 | Status: SHIPPED | OUTPATIENT
Start: 2023-06-01

## 2023-06-01 RX ORDER — GABAPENTIN 300 MG/1
CAPSULE ORAL
Qty: 90 CAPSULE | OUTPATIENT
Start: 2023-06-01

## 2023-06-01 NOTE — TELEPHONE ENCOUNTER
Provider: PINO HERZOG     Caller: HARISH HOOPER     Phone Number: [phone]    Reason for Call: PATIENT IS CALLING BACK TO CHECK THE STATUS OF THIS.

## 2023-06-01 NOTE — TELEPHONE ENCOUNTER
Caller: Xiomara Taylor    Relationship: Self    Best call back number:[phone]    What is the best time to reach you: ANYTIME    Who are you requesting to speak with (clinical staff, provider,  specific staff member): PCP/MA        What was the call regarding: PATIENT STATED SHE WAS IN OFFICE YESTERDAY AND SENT HOME WITH A NEBULIZER. THE MEDICATION FOR THE NEBULIZER WAS NEVER SENT TO THE PHARMACY. PATIENT STATED FROM THE VISIT YESTERDAY THE ALBUTEROL SOLUTION AND PREDNISONE WAS TO HAVE BEEN CALLED IN TO [facility] IN [place] [address] AND SHE ALSO HAS A REQUEST ALREADY IN FOR MELOXICAM AND GABAPENTIN. MELOXICAM LOOKS LIKE IT WAS SENT IN YESTERDAY    Is it okay if the provider responds through MyChart: NO. CALL PATIENT ONCE ALL THE MEDICATION HAS BEEN SENT IN

## 2023-06-02 RX ORDER — GABAPENTIN 300 MG/1
300 CAPSULE ORAL 3 TIMES DAILY
Qty: 90 CAPSULE | Refills: 0 | Status: SHIPPED | OUTPATIENT
Start: 2023-06-02

## 2023-06-02 NOTE — TELEPHONE ENCOUNTER
Reached Pt to verify receipt of medication. Pt confirmed she did receive. Good verbal understanding.

## 2023-06-07 ENCOUNTER — OFFICE VISIT (OUTPATIENT)
Dept: INTERNAL MEDICINE | Facility: CLINIC | Age: 53
End: 2023-06-07
Payer: COMMERCIAL

## 2023-06-07 VITALS
TEMPERATURE: 97.8 F | DIASTOLIC BLOOD PRESSURE: 88 MMHG | WEIGHT: 144.4 LBS | HEIGHT: 67 IN | BODY MASS INDEX: 22.66 KG/M2 | RESPIRATION RATE: 16 BRPM | SYSTOLIC BLOOD PRESSURE: 146 MMHG | HEART RATE: 72 BPM

## 2023-06-07 DIAGNOSIS — J01.40 ACUTE NON-RECURRENT PANSINUSITIS: Primary | ICD-10-CM

## 2023-06-07 DIAGNOSIS — J40 BRONCHITIS: ICD-10-CM

## 2023-06-07 DIAGNOSIS — J01.10 ACUTE NON-RECURRENT FRONTAL SINUSITIS: Primary | ICD-10-CM

## 2023-06-07 PROCEDURE — 99214 OFFICE O/P EST MOD 30 MIN: CPT | Performed by: NURSE PRACTITIONER

## 2023-06-07 RX ORDER — FLUTICASONE PROPIONATE AND SALMETEROL 100; 50 UG/1; UG/1
1 POWDER RESPIRATORY (INHALATION)
Qty: 60 EACH | Refills: 2 | Status: SHIPPED | OUTPATIENT
Start: 2023-06-07

## 2023-06-07 RX ORDER — SULFAMETHOXAZOLE AND TRIMETHOPRIM 800; 160 MG/1; MG/1
1 TABLET ORAL 2 TIMES DAILY
Qty: 20 TABLET | Refills: 0 | Status: SHIPPED | OUTPATIENT
Start: 2023-06-07

## 2023-06-07 NOTE — PROGRESS NOTES
Patient Name: Xiomara Taylor  : 1970   MRN: [id number]     Chief Complaint:    Chief Complaint   Patient presents with    Cough       History of Present Illness: Xiomara Taylor is a 52 y.o. female presents to clinic for follow-up.   The patient states she started to experience her symptoms on 2023 taking Sudafed and Advil with no relief. She adds on 2023 she went to urgent care. She had a  sinus infection, and was given Medrol Dosepak and doxycycline.  She has completed the doxycycline. She was seen in clinic on 2023.  She was given nebulizer treatment in the office and prescribed albuterol nebs.  She has been using albuterol nebs 4 times a day with some improvement.  Medrol Dosepak was discontinued and prednisone 40 mg burst was given for 5 days.  She continues to have head pressure in as well as cough.  The bumps on her tongue have improved but are still they are.       Checks x-ray on 2023 completed did not show any abnormality.  Subjective     Review of System: Review of Systems   A review of systems was performed, and the pertinent positives are noted in the HPI.    Medications:     Current Outpatient Medications:     albuterol (PROVENTIL) (2.5 MG/3ML) 0.083% nebulizer solution, Take 2.5 mg by nebulization Every 4 (Four) Hours As Needed for Wheezing., Disp: 90 each, Rfl: 11    albuterol sulfate  (90 Base) MCG/ACT inhaler, Inhale 2 puffs Every 4 (Four) Hours As Needed for Wheezing., Disp: 8 g, Rfl: 3    CBD (cannabidiol) oral oil, cannabidiol (CBD) oral oil  Take by oral route., Disp: , Rfl:     fexofenadine-pseudoephedrine (ALLEGRA-D 24) 180-240 MG per 24 hr tablet, Take 1 tablet by mouth Daily., Disp: , Rfl:     Fluticasone-Salmeterol (ADVAIR/WIXELA) 100-50 MCG/ACT DISKUS, Inhale 1 puff 2 (Two) Times a Day., Disp: 60 each, Rfl: 2    gabapentin (NEURONTIN) 300 MG capsule, Take 1 capsule by mouth 3 (Three) Times a Day., Disp: 90 capsule, Rfl: 0    lamoTRIgine (LaMICtal) 200 MG  "tablet, , Disp: , Rfl:     meloxicam (MOBIC) 15 MG tablet, Take 1 tablet by mouth Daily., Disp: 90 tablet, Rfl: 1    propranolol (INDERAL) 10 MG tablet, Take 1 tablet by mouth 2 (Two) Times a Day., Disp: , Rfl:     QUEtiapine (SEROquel) 100 MG tablet, , Disp: , Rfl:     sulfamethoxazole-trimethoprim (Bactrim DS) 800-160 MG per tablet, Take 1 tablet by mouth 2 (Two) Times a Day., Disp: 20 tablet, Rfl: 0    Allergies:   Allergies   Allergen Reactions    Augmentin [Amoxicillin-Pot Clavulanate] Hives    Diflucan [Fluconazole] Hives       Objective     Physical Exam:   Vital Signs:   Vitals:    06/07/23 1030   BP: 146/88   BP Location: Right arm   Patient Position: Sitting   Cuff Size: Adult   Pulse: 72   Resp: 16   Temp: 97.8 °F (36.6 °C)   TempSrc: Infrared   Weight: 65.5 kg (144 lb 6.4 oz)   Height: 170.2 cm (67\")     Body mass index is 22.62 kg/m². BMI is within normal parameters. No other follow-up for BMI required.      Physical Exam  Constitutional:       Appearance: She is ill-appearing.   HENT:      Right Ear: Tympanic membrane normal.      Left Ear: Tympanic membrane normal.      Nose: No congestion or rhinorrhea.      Right Sinus: Maxillary sinus tenderness and frontal sinus tenderness present.      Left Sinus: Maxillary sinus tenderness and frontal sinus tenderness present.      Mouth/Throat:      Comments: Posterior tongue has several bumps  Cardiovascular:      Rate and Rhythm: Normal rate and regular rhythm.      Heart sounds: Normal heart sounds. No murmur heard.  Pulmonary:      Effort: No respiratory distress.      Breath sounds: No stridor. Wheezing (coarse) present. No rhonchi or rales.   Abdominal:      General: Bowel sounds are normal.      Tenderness: There is no abdominal tenderness.   Lymphadenopathy:      Cervical: No cervical adenopathy.   Skin:     General: Skin is warm and dry.       Assessment / Plan      Assessment/Plan:   Diagnoses and all orders for this visit:    1. Acute non-recurrent " pansinusitis (Primary)  -Bactrim DS 1 tablet twice daily for 10 days    2. Bronchitis   -Continue albuterol nebs 4 times a day   -Fluticasone Solu-Medrol 100-50 puff 2 times a day    She will follow-up if tongue worsens.     Explained and discussed patient's condition and plan of care.  Discussed when to follow-up.  Discussed possible red flags and how to follow-up with those.  Viewed patient's medications and discussed common side effects. Patient to continue current medications as advised.  Be compliant with medications. Patient to let me know if they have any worsening, no improvement, does not tolerate medication, or any future concerns about treatment. ER for emergencies.  Patient verbalized an understanding and agreement with plan of care.        Follow Up:   2 weeks     VANESSA Teixeira  [facility]

## 2023-06-16 ENCOUNTER — TELEMEDICINE (OUTPATIENT)
Dept: INTERNAL MEDICINE | Facility: CLINIC | Age: 53
End: 2023-06-16
Payer: COMMERCIAL

## 2023-06-16 DIAGNOSIS — B00.1 FEVER BLISTER: Primary | ICD-10-CM

## 2023-06-16 DIAGNOSIS — Q38.3 TONGUE ABNORMALITY: ICD-10-CM

## 2023-06-16 PROCEDURE — 99214 OFFICE O/P EST MOD 30 MIN: CPT | Performed by: NURSE PRACTITIONER

## 2023-06-16 RX ORDER — VALACYCLOVIR HYDROCHLORIDE 500 MG/1
500 TABLET, FILM COATED ORAL DAILY
Qty: 30 TABLET | Refills: 5 | Status: SHIPPED | OUTPATIENT
Start: 2023-06-16

## 2023-06-16 RX ORDER — VALACYCLOVIR HYDROCHLORIDE 1 G/1
2000 TABLET, FILM COATED ORAL 2 TIMES DAILY
Qty: 4 TABLET | Refills: 0 | Status: SHIPPED | OUTPATIENT
Start: 2023-06-16

## 2023-06-16 NOTE — PROGRESS NOTES
Patient Name: Xiomara Taylor  : 1970   MRN: [id number]     Chief Complaint:  fever blisters     History of Present Illness: Xiomara Taylor is a 52 y.o. female presents for telehealth.  You have chosen to receive care through a telehealth visit. Patient consents to video/audio connection for your medical care today.   Patient is in [facility] and provider is at [facility] [place] [id number].      She has had three episodes of fever blisters over the last couple weeks.  Her mother and mother's sitter's also have had fever blisters.  The last 1 caused scarring on her lip.  She has used her friend's acyclovir; unsure of milligrams.  She took 2 yesterday and 1 today.  She has had bumps on her tongue for several weeks. The bumps on her tongue have improved slightly; they are not sore.   Cough is improved.  There is no shortness of breath or wheezing.  Subjective     Review of System: Review of Systems   HENT:          Bumps on the back of her tongue (improved)  Fever blisters      Medications:     Current Outpatient Medications:     albuterol (PROVENTIL) (2.5 MG/3ML) 0.083% nebulizer solution, Take 2.5 mg by nebulization Every 4 (Four) Hours As Needed for Wheezing., Disp: 90 each, Rfl: 11    albuterol sulfate  (90 Base) MCG/ACT inhaler, Inhale 2 puffs Every 4 (Four) Hours As Needed for Wheezing., Disp: 8 g, Rfl: 3    CBD (cannabidiol) oral oil, cannabidiol (CBD) oral oil  Take by oral route., Disp: , Rfl:     fexofenadine-pseudoephedrine (ALLEGRA-D 24) 180-240 MG per 24 hr tablet, Take 1 tablet by mouth Daily., Disp: , Rfl:     Fluticasone-Salmeterol (ADVAIR/WIXELA) 100-50 MCG/ACT DISKUS, Inhale 1 puff 2 (Two) Times a Day., Disp: 60 each, Rfl: 2    gabapentin (NEURONTIN) 300 MG capsule, Take 1 capsule by mouth 3 (Three) Times a Day., Disp: 90 capsule, Rfl: 0    lamoTRIgine (LaMICtal) 200 MG tablet, , Disp: , Rfl:     meloxicam (MOBIC) 15  MG tablet, Take 1 tablet by mouth Daily., Disp: 90 tablet, Rfl: 1    propranolol (INDERAL) 10 MG tablet, Take 1 tablet by mouth 2 (Two) Times a Day., Disp: , Rfl:     QUEtiapine (SEROquel) 100 MG tablet, , Disp: , Rfl:     sulfamethoxazole-trimethoprim (Bactrim DS) 800-160 MG per tablet, Take 1 tablet by mouth 2 (Two) Times a Day., Disp: 20 tablet, Rfl: 0    valACYclovir (Valtrex) 1000 MG tablet, Take 2 tablets by mouth 2 (Two) Times a Day., Disp: 4 tablet, Rfl: 0    valACYclovir (Valtrex) 500 MG tablet, Take 1 tablet by mouth Daily., Disp: 30 tablet, Rfl: 5    Allergies:   Allergies   Allergen Reactions    Augmentin [Amoxicillin-Pot Clavulanate] Hives    Diflucan [Fluconazole] Hives       Objective     Physical Exam:   Vital Signs: There were no vitals filed for this visit.  There is no height or weight on file to calculate BMI. BMI is within normal parameters. No other follow-up for BMI required.      Physical Exam  Constitutional:       Appearance: Normal appearance.   HENT:      Mouth/Throat:      Comments: Fever blisters  Pulmonary:      Effort: Pulmonary effort is normal.   Psychiatric:         Mood and Affect: Mood normal.       Assessment / Plan      Assessment/Plan:   Diagnoses and all orders for this visit:    1. Fever blister (Primary)  -     valACYclovir (Valtrex) 500 MG tablet; Take 1 tablet by mouth Daily.  Dispense: 30 tablet; Refill: 5  -     valACYclovir (Valtrex) 1000 MG tablet; Take 2 tablets by mouth 2 (Two) Times a Day.  Dispense: 4 tablet; Refill: 0    2. Tongue abnormality       Due to exacerbation of fever blisters we will prescribe abortive therapy. She has had several recent flareups.  We will place her on preventative also.  Patient will monitor tongue bumps.  She will let me know if there worsening.  She declined an ENT referral.    20-minute visit.  Follow Up:   [facility]

## 2023-08-10 ENCOUNTER — TELEPHONE (OUTPATIENT)
Dept: INTERNAL MEDICINE | Facility: CLINIC | Age: 53
End: 2023-08-10
Payer: COMMERCIAL

## 2023-08-10 DIAGNOSIS — N13.2 URETERAL STONE WITH HYDRONEPHROSIS: Primary | ICD-10-CM

## 2023-08-10 NOTE — TELEPHONE ENCOUNTER
Patient went to [facility] ER and was diagnosed with 3 kidney stones, a gallstone and UTI. 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 She wants to be referred to urology, as soon as possible. Lauren Thao, talked to the patient. Please call: [phone]

## 2023-08-10 NOTE — TELEPHONE ENCOUNTER
Please let Xiomara know that I put an urgent referral into urology.  She should be seen within the next week.  By the location of her ureter stone she could have intermittent obstruction and may need to return to the ER/admission if worsening.

## 2023-08-15 ENCOUNTER — OFFICE VISIT (OUTPATIENT)
Dept: UROLOGY | Facility: CLINIC | Age: 53
End: 2023-08-15
Payer: COMMERCIAL

## 2023-08-15 VITALS
DIASTOLIC BLOOD PRESSURE: 74 MMHG | BODY MASS INDEX: 22.76 KG/M2 | HEART RATE: 112 BPM | HEIGHT: 67 IN | OXYGEN SATURATION: 99 % | WEIGHT: 145 LBS | SYSTOLIC BLOOD PRESSURE: 162 MMHG

## 2023-08-15 DIAGNOSIS — N20.1 LEFT URETERAL STONE: ICD-10-CM

## 2023-08-15 DIAGNOSIS — N13.2 URETERAL STONE WITH HYDRONEPHROSIS: Primary | ICD-10-CM

## 2023-08-15 DIAGNOSIS — R30.0 DYSURIA: ICD-10-CM

## 2023-08-15 LAB
BILIRUB BLD-MCNC: NEGATIVE MG/DL
CLARITY, POC: CLEAR
COLOR UR: YELLOW
EXPIRATION DATE: ABNORMAL
GLUCOSE UR STRIP-MCNC: NEGATIVE MG/DL
KETONES UR QL: NEGATIVE
LEUKOCYTE EST, POC: NEGATIVE
Lab: ABNORMAL
NITRITE UR-MCNC: NEGATIVE MG/ML
PH UR: 6 [PH] (ref 5–8)
PROT UR STRIP-MCNC: NEGATIVE MG/DL
RBC # UR STRIP: ABNORMAL /UL
SP GR UR: 1 (ref 1–1.03)
UROBILINOGEN UR QL: NORMAL

## 2023-08-15 PROCEDURE — 87086 URINE CULTURE/COLONY COUNT: CPT | Performed by: STUDENT IN AN ORGANIZED HEALTH CARE EDUCATION/TRAINING PROGRAM

## 2023-08-15 PROCEDURE — 82365 CALCULUS SPECTROSCOPY: CPT | Performed by: STUDENT IN AN ORGANIZED HEALTH CARE EDUCATION/TRAINING PROGRAM

## 2023-08-15 RX ORDER — ONDANSETRON 4 MG/1
4 TABLET, ORALLY DISINTEGRATING ORAL
COMMUNITY
Start: 2023-08-10 | End: 2023-08-17

## 2023-08-15 RX ORDER — CEFDINIR 300 MG/1
300 CAPSULE ORAL
COMMUNITY
Start: 2023-08-10

## 2023-08-15 RX ORDER — OXYCODONE AND ACETAMINOPHEN 7.5; 325 MG/1; MG/1
1 TABLET ORAL
COMMUNITY
Start: 2023-08-10 | End: 2023-08-20

## 2023-08-15 RX ORDER — TAMSULOSIN HYDROCHLORIDE 0.4 MG/1
0.4 CAPSULE ORAL DAILY
COMMUNITY
Start: 2023-08-10

## 2023-08-15 NOTE — PROGRESS NOTES
Office Visit New Urology      Patient Name: Xiomara Taylor  : 1970   MRN: [id number]     Chief Complaint:    Chief Complaint   Patient presents with    Ureteral Stone withy Hydronephrosis       Referring Provider: Philomena Healy APRN    History of Present Illness: Xiomara Taylor is a 52 y.o. female who presents to Urology today for evaluation of left ureteral stones. She developed significant difficulty urinating with frequent urination and urinary urgency last week.    Last Thursday morning she woke up feeling like she had a severe UTI. She then began developing severe left flank pain, left abdominal pain with nausea.     CT AP w/o contrast on 8/10/23 demonstrates left hydronephrosis, per report there are two separate 4 mm stones in the proximal left ureter an additional 1 mm stone in the distal left ureter. Images are not available for review currently.  We have requested these to be electronically to our office to review.     She has noticed some gross hematuria. She is currently on Cefdinir. No culture result from [facility] is available.     Prior to today, patient states she has been drinking nothing but Mountain Dew.  She is now drinking more water.      Subjective      Review of System: Review of Systems   Genitourinary:  Positive for hematuria.    I have reviewed the ROS documented by my clinical staff, I have updated appropriately and I agree. Mike Rincon MD    Past Medical History:   Past Medical History:   Diagnosis Date    Anxiety     Arthritis     Bipolar 1 disorder     Depression     Pancreatitis        Past Surgical History:   Past Surgical History:   Procedure Laterality Date    CERVICAL DISCECTOMY ANTERIOR      THORACIC DISCECTOMY         Family History:   Family History   Problem Relation Age of Onset    Hypertension Mother     Arthritis Mother     Mental illness Maternal Aunt     Hypertension Maternal Aunt     Diabetes Maternal Aunt     Cancer Maternal Aunt     Heart disease  Maternal Uncle     Diabetes Maternal Uncle     Mental illness Maternal Grandmother     Breast cancer Other         age unknown    Ovarian cancer Neg Hx        Social History:   Social History     Socioeconomic History    Marital status: Single   Tobacco Use    Smoking status: Former     Packs/day: 0.50     Years: 0.50     Pack years: 0.25     Types: Cigarettes     Quit date: 2023     Years since quittin.2    Smokeless tobacco: Never   Vaping Use    Vaping Use: Never used   Substance and Sexual Activity    Alcohol use: Not Currently     Comment: quit in     Drug use: Never    Sexual activity: Not Currently       Medications:     Current Outpatient Medications:     albuterol (PROVENTIL) (2.5 MG/3ML) 0.083% nebulizer solution, Take 2.5 mg by nebulization Every 4 (Four) Hours As Needed for Wheezing., Disp: 90 each, Rfl: 11    albuterol sulfate  (90 Base) MCG/ACT inhaler, Inhale 2 puffs Every 4 (Four) Hours As Needed for Wheezing., Disp: 8 g, Rfl: 3    CBD (cannabidiol) oral oil, cannabidiol (CBD) oral oil  Take by oral route., Disp: , Rfl:     cefdinir (OMNICEF) 300 MG capsule, Take 1 capsule by mouth., Disp: , Rfl:     fexofenadine-pseudoephedrine (ALLEGRA-D 24) 180-240 MG per 24 hr tablet, Take 1 tablet by mouth Daily., Disp: , Rfl:     Fluticasone-Salmeterol (ADVAIR/WIXELA) 100-50 MCG/ACT DISKUS, Inhale 1 puff 2 (Two) Times a Day., Disp: 60 each, Rfl: 2    gabapentin (NEURONTIN) 300 MG capsule, TAKE ONE CAPSULE BY MOUTH THREE TIMES A DAY, Disp: 90 capsule, Rfl: 2    lamoTRIgine (LaMICtal) 200 MG tablet, , Disp: , Rfl:     meloxicam (MOBIC) 15 MG tablet, Take 1 tablet by mouth Daily., Disp: 90 tablet, Rfl: 1    ondansetron ODT (ZOFRAN-ODT) 4 MG disintegrating tablet, Take 1 tablet by mouth., Disp: , Rfl:     oxyCODONE-acetaminophen (PERCOCET) 7.5-325 MG per tablet, Take 1 tablet by mouth., Disp: , Rfl:     propranolol (INDERAL) 10 MG tablet, Take 1 tablet by mouth 2 (Two) Times a Day., Disp: , Rfl:  "    QUEtiapine (SEROquel) 100 MG tablet, , Disp: , Rfl:     tamsulosin (FLOMAX) 0.4 MG capsule 24 hr capsule, Take 1 capsule by mouth Daily., Disp: , Rfl:     valACYclovir (Valtrex) 500 MG tablet, Take 1 tablet by mouth Daily., Disp: 30 tablet, Rfl: 5    Allergies:   Allergies   Allergen Reactions    Augmentin [Amoxicillin-Pot Clavulanate] Hives    Diflucan [Fluconazole] Hives       Objective     Physical Exam:   Vital Signs:   Vitals:    08/15/23 1019   BP: 162/74   Pulse: 112   SpO2: 99%   Weight: 65.8 kg (145 lb)   Height: 170.2 cm (67\")     Body mass index is 22.71 kg/mý.     Physical Exam  Constitutional:       Appearance: Normal appearance.   HENT:      Head: Normocephalic and atraumatic.      Nose: Nose normal.      Mouth/Throat:      Mouth: Mucous membranes are moist.      Pharynx: Oropharynx is clear.   Eyes:      Extraocular Movements: Extraocular movements intact.      Pupils: Pupils are equal, round, and reactive to light.   Pulmonary:      Effort: Pulmonary effort is normal. No respiratory distress.   Abdominal:      Tenderness: There is left CVA tenderness.   Musculoskeletal:         General: No swelling or deformity. Normal range of motion.      Cervical back: Normal range of motion and neck supple.   Skin:     General: Skin is warm and dry.   Neurological:      General: No focal deficit present.      Mental Status: She is alert and oriented to person, place, and time. Mental status is at baseline.   Psychiatric:         Mood and Affect: Mood normal.         Behavior: Behavior normal.       Labs:   Brief Urine Lab Results  (Last result in the past 365 days)        Color   Clarity   Blood   Leuk Est   Nitrite   Protein   CREAT   Urine HCG        08/15/23 1033 Yellow   Clear   3+   Negative   Negative   Negative                        Lab Results   Component Value Date    GLUCOSE 82 04/19/2023    CALCIUM 9.2 04/19/2023     (H) 04/19/2023    K 5.2 04/19/2023    CO2 29.0 04/19/2023     (H) " 04/19/2023    BUN 14 04/19/2023    CREATININE 0.84 04/19/2023    BCR 16.7 04/19/2023    ANIONGAP 9.0 04/19/2023       Lab Results   Component Value Date    WBC 8.07 04/19/2023    HGB 14.7 04/19/2023    HCT 43.2 04/19/2023    MCV 89.4 04/19/2023     04/19/2023       Images:   CT Abdomen Pelvis Without Contrast    Result Date: 8/10/2023  Mild left hydronephrosis from a 1 mm stone in the distal ureter. There are 2 stones in the proximal ureter that could cause intermittent obstruction. Images reviewed, interpreted, and dictated by GEORGE Onofre MD    XR Chest PA & Lateral    Result Date: 5/31/2023  Impression: No acute cardiopulmonary findings. Electronically Signed: Alexandre Paul  5/31/2023 3:03 PM EDT  Workstation ID: [id number]    Mammo Screening Digital Tomosynthesis Bilateral With CAD    Result Date: 7/24/2023  Negative bilateral mammogram.  RECOMMENDATION:  Continue annual screening mammography.  BI-RADS CATEGORY 1, NEGATIVE.   CAD was utilized.  The standard false-negative rate of mammography is between 10% and 25%. Complex patterns or increased breast density will markedly elevate the false-negative rate of mammography.   A letter, in lay terminology, with the results of this exam will be mailed to the patient.   This report was finalized on 7/24/2023 1:30 PM by Dr. Isabel Navas MD.        Measures:   Tobacco:   Xiomara Taylor  reports that she quit smoking about 2 months ago. Her smoking use included cigarettes. She has a 0.25 pack-year smoking history. She has never used smokeless tobacco.      Assessment / Plan      Assessment/Plan:   Xiomara Taylor is a 52 y.o. female who presented today for evaluation of 3 separate ureteral stones.  Patient believes she passed a small stone which she brings in for analysis today.  She has 2 separate 4 mm proximal ureteral stones with hydronephrosis on the left.  She is having some symptoms but is able to eat drink and work.  We will plan for repeat CT scan  within 2 to 3 weeks to assess for passage.  If stones have not passed by 2 to 3 weeks, we will again discuss elective treatment.  Elective treatment options discussed include ureteroscopy and lithotripsy versus shockwave lithotripsy.  She would likely be best served with ureteroscopy and lithotripsy if necessary.  She is never had a previous stone episode before.  Kidney stone precautions discussed.    We discussed she is at risk of chronic renal injury if the stones do not pass.  We discussed she is at risk of developing recurrent UTI, if any high fevers, chills, nausea, vomiting, she should present to the emergency room.  If unable to tolerate discomfort she should present to the emergency department for repeat scan and consideration of operation at that time.  I will have her CT imaging uploaded for review.    Diagnoses and all orders for this visit:    1. Ureteral stone with hydronephrosis (Primary)  -     POC Urinalysis Dipstick, Automated  -     CT Abdomen Pelvis Stone Protocol; Future    2. Left ureteral stone  -     Urine Culture - Urine, Urine, Clean Catch  -     CT Abdomen Pelvis Stone Protocol; Future  -     STONE ANALYSIS - Calculus,    3. Dysuria  -     Urine Culture - Urine, Urine, Clean Catch         Follow Up:   No follow-ups on file.    I spent approximately 45 minutes providing clinical care for this patient; including review of patient's chart and provider documentation, face to face time spent with patient in examination room (obtaining history, performing physical exam, discussing diagnosis and management options), placing orders, and completing patient documentation.     Mike Rincon MD  [facility] [place]

## 2023-08-16 ENCOUNTER — TELEPHONE (OUTPATIENT)
Dept: UROLOGY | Facility: CLINIC | Age: 53
End: 2023-08-16
Payer: COMMERCIAL

## 2023-08-16 LAB — BACTERIA SPEC AEROBE CULT: NO GROWTH

## 2023-08-22 LAB
COLOR STONE: NORMAL
COMPN STONE: NORMAL
LABORATORY COMMENT REPORT: NORMAL
Lab: NORMAL
Lab: NORMAL
PHOTO: NORMAL
SIZE STONE: NORMAL MM
SPEC SOURCE SUBJ: NORMAL
URATE MFR STONE: 100 %
WT STONE: 11 MG

## 2023-09-11 ENCOUNTER — HOSPITAL ENCOUNTER (OUTPATIENT)
Dept: CT IMAGING | Facility: HOSPITAL | Age: 53
Discharge: HOME | End: 2023-09-11
Admitting: STUDENT IN AN ORGANIZED HEALTH CARE EDUCATION/TRAINING PROGRAM
Payer: COMMERCIAL

## 2023-09-11 ENCOUNTER — TELEPHONE (OUTPATIENT)
Dept: UROLOGY | Facility: CLINIC | Age: 53
End: 2023-09-11
Payer: COMMERCIAL

## 2023-09-11 DIAGNOSIS — N13.2 URETERAL STONE WITH HYDRONEPHROSIS: ICD-10-CM

## 2023-09-11 DIAGNOSIS — N20.1 LEFT URETERAL STONE: ICD-10-CM

## 2023-09-11 DIAGNOSIS — N20.1 LEFT URETERAL STONE: Primary | ICD-10-CM

## 2023-09-11 PROCEDURE — 74176 CT ABD & PELVIS W/O CONTRAST: CPT

## 2023-09-11 NOTE — TELEPHONE ENCOUNTER
Patient's repeat CT scan demonstrates persistent left proximal ureteral stones x2 with mild hydronephrosis.  It has been 1 month since she was last scanned.  I recommend elective treatment with ureteroscopy and lithotripsy.  We discussed risk benefits and alternatives.  We will try to get her onto my surgery schedule on 9/13/2023 at the outpatient surgery center.    PLAN  - L URS, LL, stent 9/13/23 [facility]    Mike Rincon MD

## 2023-09-13 ENCOUNTER — DOCUMENTATION (OUTPATIENT)
Dept: UROLOGY | Facility: CLINIC | Age: 53
End: 2023-09-13

## 2023-09-13 ENCOUNTER — LAB REQUISITION (OUTPATIENT)
Dept: LAB | Facility: HOSPITAL | Age: 53
End: 2023-09-13
Payer: COMMERCIAL

## 2023-09-13 ENCOUNTER — TELEPHONE (OUTPATIENT)
Dept: UROLOGY | Facility: CLINIC | Age: 53
End: 2023-09-13

## 2023-09-13 ENCOUNTER — OUTSIDE FACILITY SERVICE (OUTPATIENT)
Dept: UROLOGY | Facility: CLINIC | Age: 53
End: 2023-09-13
Payer: COMMERCIAL

## 2023-09-13 DIAGNOSIS — N20.0 KIDNEY STONE ON LEFT SIDE: Primary | ICD-10-CM

## 2023-09-13 DIAGNOSIS — N20.0 CALCULUS OF KIDNEY: ICD-10-CM

## 2023-09-13 PROCEDURE — 52356 CYSTO/URETERO W/LITHOTRIPSY: CPT | Performed by: STUDENT IN AN ORGANIZED HEALTH CARE EDUCATION/TRAINING PROGRAM

## 2023-09-13 PROCEDURE — 82365 CALCULUS SPECTROSCOPY: CPT | Performed by: STUDENT IN AN ORGANIZED HEALTH CARE EDUCATION/TRAINING PROGRAM

## 2023-09-13 PROCEDURE — 74420 UROGRAPHY RTRGR +-KUB: CPT | Performed by: STUDENT IN AN ORGANIZED HEALTH CARE EDUCATION/TRAINING PROGRAM

## 2023-09-13 RX ORDER — NITROFURANTOIN 25; 75 MG/1; MG/1
100 CAPSULE ORAL 2 TIMES DAILY
Qty: 10 CAPSULE | Refills: 0 | Status: SHIPPED | OUTPATIENT
Start: 2023-09-13

## 2023-09-13 RX ORDER — HYOSCYAMINE SULFATE 0.12 MG/1
0.12 TABLET SUBLINGUAL EVERY 4 HOURS PRN
Qty: 30 EACH | Refills: 1 | Status: SHIPPED | OUTPATIENT
Start: 2023-09-13

## 2023-09-13 RX ORDER — HYDROCODONE BITARTRATE AND ACETAMINOPHEN 5; 325 MG/1; MG/1
1 TABLET ORAL EVERY 6 HOURS PRN
Qty: 12 TABLET | Refills: 0 | Status: SHIPPED | OUTPATIENT
Start: 2023-09-13

## 2023-09-13 RX ORDER — PHENAZOPYRIDINE HYDROCHLORIDE 200 MG/1
200 TABLET, FILM COATED ORAL 3 TIMES DAILY PRN
Qty: 20 TABLET | Refills: 0 | Status: SHIPPED | OUTPATIENT
Start: 2023-09-13

## 2023-09-13 NOTE — PROGRESS NOTES
[facility]   [address] [place] [id number]      OPERATIVE REPORT     Patient Name:  Xiomara Taylor  YOB: 1970    Patient MRN: [id number]    Date of Surgery:  9/13/23     Indications:  53 yo F with a history of left ureteral stones who presents for L ureteroscopy and laser lithotripsy. Informed consent obtained, risks discussed.       Pre-op Diagnosis:   Left  ureteral stone    Post-op Diagnosis:   Left ureteral stone    Procedures Performed:  CYSTOSCOPY  LEFT RETROGRADE PYELOGRAM  LEFT URETEROSCOPY, LASER LITHOTRIPSY  LEFT URETERAL STENT PLACEMENT    Staff:  Mike Rincon MD    Anesthesia: General    Estimated Blood Loss: Minimal    Implants:  4.8 Fr x 26 cm stent on strings    Specimen:  Left kidney stone    Drains: None      Findings: 5+ mm left ureteral stone retropulsed into kidney, lasered, removed via wire basket. 4.8 Fr x 26 cm stent on strings.     Complications: None    Description of Procedure:    After informed consent, the patient was brought back to the operating suite and moved over to the operating table. General anesthesia was smoothly induced, IV antibiotics were administered, and the patient was placed in the dorsal lithotomy position with careful attention focused on padding all pressure points. The patient was prepped and draped in standard fashion. A timeout was performed to ensure the correct patient and procedure    A 22Fr cystoscope was used to cannulate the urethra. The urethra was of normal course and caliber.  Upon entering the bladder, pan-cystoscopy revealed no bladder abnormalities.  The bilateral ureteral orifices were visualized in their orthotopic positions.     Left retrograde Pyelogram Interpretation  Attention was then turned to the left ureteral orifice which was cannulated with a 5 Fr open ended ureteral catheter. A retrograde pyelogram was performed demonstrating normal course and caliber of the ureter, outlining the location  of the renal pelvis and collecting system, with a filling defect in the proximal ureter consistent with location of stone on CT, there was mild hydronephrosis noted.     A sensor wire was advanced up the left ureter and into the collecting system on fluoroscopy to serve as a safety wire which was clamped to the surgical drapes.     A second wire was advanced into the left kidney.  A digital flexible ureteroscope was advanced over the wire into the proximal ureter, the wire was removed.  The stones then were identified in the proximal ureter and retropulsed back into the kidney, lower pole.  A second wire was readvanced into the kidney, the scope was backed out.  An 10 [place] by 12 [place] by 38 cm ureteral access sheath was advanced over the working wire into the proximal ureter.  The digital flexible ureteroscope was readvanced into the kidney.  The stone within the lower pole was then lasered with a 200 µm thulium laser fiber into multiple small fragments.  A Nitinol wire basket was then utilized to basket all these larger stone fragments out.  The laser was reinserted and residual stones were dusted into tiny fragments.  No other stone fragments were identified in the upper, middle, lower poles.  The ureteroscope and access sheath were backed out and no ureteral injuries were identified.    Using fluoroscopic guidance, a ureteral stent was then placed. A 4.8 F x 26 cm double J ureteral stent was advanced up the left ureter over our safety wire. Fluoroscopy confirmed good curl in both the kidney and the bladder. The bladder was drained, and this concluded our procedure.    Stent strings were then externalized and taped to the patient's left thigh with mastisol and tegaderm.     The patient was brought back to the PACU in stable condition. All scopes and instruments were in good working order at the end of the case. There were no complications.    Disposition/Follow Up: Remove stent on strings on Sunday, 9/17/2023,  follow-up in 8 weeks with renal ultrasound prior.    Mike Rincon MD     Date: 9/13/2023  Time: 14:56 EDT

## 2023-09-13 NOTE — TELEPHONE ENCOUNTER
S/p left ureteroscopy and lithotripsy, stent on strings.     F/u in clinic in 8 wks with renal US prior.     Mike Rincon MD

## 2023-09-14 ENCOUNTER — TELEPHONE (OUTPATIENT)
Dept: UROLOGY | Facility: CLINIC | Age: 53
End: 2023-09-14

## 2023-09-14 DIAGNOSIS — N32.89 BLADDER SPASMS: Primary | ICD-10-CM

## 2023-09-14 RX ORDER — OXYBUTYNIN CHLORIDE 10 MG/1
10 TABLET, EXTENDED RELEASE ORAL DAILY
Qty: 15 TABLET | Refills: 0 | Status: SHIPPED | OUTPATIENT
Start: 2023-09-14 | End: 2023-09-29

## 2023-09-14 NOTE — TELEPHONE ENCOUNTER
Caller: HARISH HOOPER    Relationship to patient: SELF    Best call back number: [phone]    Patient is needing: URINARY URGE NON STOP.  BRIGHT ORANGE IN URINE EXTREME PAIN WHERE THE STENT IS. PT IS TAKING MEDS.  PLEASE CALL PT TO REASSURE      PT WILL NEED DR NOTE FOR WORK DUE TO CIRCUMSTANCES.  PLEASE FAX DR NOTE [phone]

## 2023-09-21 LAB
CALCIUM OXALATE DIHYDRATE MFR STONE IR: 60 %
COLOR STONE: NORMAL
COM MFR STONE: 40 %
COMPN STONE: NORMAL
LABORATORY COMMENT REPORT: NORMAL
LABORATORY COMMENT REPORT: NORMAL
Lab: NORMAL
Lab: NORMAL
PHOTO: NORMAL
SIZE STONE: NORMAL MM
SPEC SOURCE SUBJ: NORMAL
WT STONE: 16 MG

## 2023-10-29 DIAGNOSIS — G62.9 NEUROPATHY: ICD-10-CM

## 2023-10-30 RX ORDER — GABAPENTIN 300 MG/1
300 CAPSULE ORAL 3 TIMES DAILY
Qty: 90 CAPSULE | Refills: 2 | Status: SHIPPED | OUTPATIENT
Start: 2023-10-30

## 2023-11-07 ENCOUNTER — HOSPITAL ENCOUNTER (OUTPATIENT)
Dept: ULTRASOUND IMAGING | Facility: HOSPITAL | Age: 53
Discharge: HOME | End: 2023-11-07
Admitting: STUDENT IN AN ORGANIZED HEALTH CARE EDUCATION/TRAINING PROGRAM
Payer: COMMERCIAL

## 2023-11-07 DIAGNOSIS — N20.0 KIDNEY STONE ON LEFT SIDE: ICD-10-CM

## 2023-11-07 PROCEDURE — 76775 US EXAM ABDO BACK WALL LIM: CPT

## 2023-11-30 ENCOUNTER — OFFICE VISIT (OUTPATIENT)
Dept: INTERNAL MEDICINE | Facility: CLINIC | Age: 53
End: 2023-11-30
Payer: COMMERCIAL

## 2023-11-30 VITALS
BODY MASS INDEX: 22.85 KG/M2 | HEIGHT: 66 IN | WEIGHT: 142.2 LBS | RESPIRATION RATE: 16 BRPM | DIASTOLIC BLOOD PRESSURE: 80 MMHG | HEART RATE: 83 BPM | SYSTOLIC BLOOD PRESSURE: 112 MMHG | TEMPERATURE: 97.1 F | OXYGEN SATURATION: 98 %

## 2023-11-30 DIAGNOSIS — Z13.220 ENCOUNTER FOR LIPID SCREENING FOR CARDIOVASCULAR DISEASE: ICD-10-CM

## 2023-11-30 DIAGNOSIS — Z00.00 WELLNESS EXAMINATION: Primary | ICD-10-CM

## 2023-11-30 DIAGNOSIS — Z72.0 TOBACCO USE: ICD-10-CM

## 2023-11-30 DIAGNOSIS — G62.9 NEUROPATHY: ICD-10-CM

## 2023-11-30 DIAGNOSIS — Z13.6 ENCOUNTER FOR LIPID SCREENING FOR CARDIOVASCULAR DISEASE: ICD-10-CM

## 2023-11-30 RX ORDER — AMOXICILLIN AND CLAVULANATE POTASSIUM 875; 125 MG/1; MG/1
TABLET, FILM COATED ORAL
COMMUNITY
Start: 2023-11-27

## 2023-11-30 RX ORDER — METHYLPREDNISOLONE 4 MG/1
TABLET ORAL
COMMUNITY
Start: 2023-11-27

## 2023-11-30 NOTE — PROGRESS NOTES
"  Female Physical Note      Patient Name: Xiomara Taylor  : 1970   MRN: [id number]     Chief Complaint:    Chief Complaint   Patient presents with    Annual Exam       History of Present Illness: Xiomara Taylor is a 53 y.o. female who is here today for their annual health maintenance and physical.   Great aunt had breast cancer; no family  history of ovarian or colon cancer    Diet/Physical activity: balanced; average 15228 steps daily     Psychiatrist-Luz Maria Jimenez ([facility])  Ortho -Dr. Pereyra as needed  Urologist-Sergey; had ureteroscopy with lithotripsy    Sleep: tries 6.5-7 hours/ night      Regular dental visits: Yes, every 6 months  Regular eye exams: Yes, yearly, wears glasses    neuropathy   S/p Anterior discectomy C7-T1 for foraminal disc herniation in 2018. She has  right nerve pain/neuropathy 4-5 right fingers. She has been on Gabapentin 300 mg TID for several years and it helps.  If not taken it feels like her fingers \"swell up like sausages\". She uses CBD drops and cream also. Denies any excessive drowsiness. It controls her symptoms.     Osteoarthritis  Patient has arthritis in feet and both knees. She has right finger joint swelling. Patient takes meloxicam daily. Denies any stomach upset or bleeding.     Bipolar disorder and Anxiety  She take seroquel 100 mg 1.5 daily and lamotrigine 200 mg 1.5 tablet . She takes propranolol as needed for anxiety; takes 1-2 x a day. Takes it rarely .     She has bronchitis; did z pack first now on medrol dose pack and augmentin.     LMP 2 years ago  Subjective     Subjective     Review of System: Review of Systems   Constitutional:  Negative for activity change, fatigue, fever and unexpected weight change.   HENT:  Positive for congestion, sinus pressure and sinus pain. Negative for rhinorrhea.    Eyes:  Negative for visual disturbance.   Respiratory:  Positive for cough, shortness of breath and wheezing.    Cardiovascular:  Negative for chest pain and " palpitations.   Gastrointestinal:  Negative for abdominal pain, diarrhea, nausea and vomiting.   Genitourinary:  Negative for dysuria, menstrual problem and vaginal discharge.   Musculoskeletal:  Negative for myalgias.   Skin:  Negative for rash.   Neurological:  Positive for numbness. Negative for headaches.   Hematological:  Negative for adenopathy.   Psychiatric/Behavioral:  Positive for sleep disturbance. Negative for dysphoric mood.    GYN ROS: normal menses, no abnormal bleeding, pelvic pain or discharge, no breast pain or new or enlarging lumps on self exam.     Past Medical History:   Past Medical History:   Diagnosis Date    Anxiety     Arthritis     Bipolar 1 disorder     Depression     Pancreatitis        Past Surgical History:   Past Surgical History:   Procedure Laterality Date    CERVICAL DISCECTOMY ANTERIOR      THORACIC DISCECTOMY      URETEROSCOPY LASER LITHOTRIPSY WITH STENT INSERTION         Family History:   Family History   Problem Relation Age of Onset    Hypertension Mother     Arthritis Mother     Mental illness Maternal Aunt     Hypertension Maternal Aunt     Diabetes Maternal Aunt     Cancer Maternal Aunt     Heart disease Maternal Uncle     Diabetes Maternal Uncle     Mental illness Maternal Grandmother     Breast cancer Other         age unknown    Ovarian cancer Neg Hx        Social History:   Social History     Socioeconomic History    Marital status: Single   Tobacco Use    Smoking status: Former     Packs/day: 0.50     Years: 0.50     Additional pack years: 0.00     Total pack years: 0.25     Types: Cigarettes     Quit date: 2023     Years since quittin.5    Smokeless tobacco: Never   Vaping Use    Vaping Use: Never used   Substance and Sexual Activity    Alcohol use: Not Currently     Comment: quit in     Drug use: Never    Sexual activity: Not Currently       Medications:     Current Outpatient Medications:     albuterol (PROVENTIL) (2.5 MG/3ML) 0.083% nebulizer  solution, Take 2.5 mg by nebulization Every 4 (Four) Hours As Needed for Wheezing., Disp: 90 each, Rfl: 11    albuterol sulfate  (90 Base) MCG/ACT inhaler, Inhale 2 puffs Every 4 (Four) Hours As Needed for Wheezing., Disp: 8 g, Rfl: 3    amoxicillin-clavulanate (AUGMENTIN) 875-125 MG per tablet, , Disp: , Rfl:     CBD (cannabidiol) oral oil, cannabidiol (CBD) oral oil  Take by oral route., Disp: , Rfl:     fexofenadine-pseudoephedrine (ALLEGRA-D 24) 180-240 MG per 24 hr tablet, Take 1 tablet by mouth Daily., Disp: , Rfl:     Fluticasone-Salmeterol (ADVAIR/WIXELA) 100-50 MCG/ACT DISKUS, Inhale 1 puff 2 (Two) Times a Day., Disp: 60 each, Rfl: 2    gabapentin (NEURONTIN) 300 MG capsule, Take 1 capsule by mouth 3 (Three) Times a Day., Disp: 90 capsule, Rfl: 2    lamoTRIgine (LaMICtal) 200 MG tablet, , Disp: , Rfl:     meloxicam (MOBIC) 15 MG tablet, Take 1 tablet by mouth Daily., Disp: 90 tablet, Rfl: 1    methylPREDNISolone (MEDROL) 4 MG dose pack, , Disp: , Rfl:     propranolol (INDERAL) 10 MG tablet, Take 1 tablet by mouth 2 (Two) Times a Day., Disp: , Rfl:     QUEtiapine (SEROquel) 100 MG tablet, , Disp: , Rfl:     valACYclovir (Valtrex) 500 MG tablet, Take 1 tablet by mouth Daily., Disp: 30 tablet, Rfl: 5    Allergies:   Allergies   Allergen Reactions    Augmentin [Amoxicillin-Pot Clavulanate] Hives    Diflucan [Fluconazole] Hives    Ketamine Anaphylaxis       Immunizations:  “Discussed risks/benefits to vaccination, reviewed components of the vaccine, discussed VIS, discussed informed consent, informed consent obtained. Patient/Parent was allowed to accept or refuse vaccine. Questions answered to satisfactory state of patient/Parent. We reviewed typical age appropriate and seasonally appropriate vaccinations. Reviewed immunization history and updated state vaccination form as needed. Patient was counseled on COVID-19  Influenza  Zoster   Hep C: Screen per guidelines     Colorectal Screening:     Last  "Completed Colonoscopy            COLORECTAL CANCER SCREENING (COLONOSCOPY - Every 10 Years) Next due on 1/18/2033 01/18/2023  SCANNED - COLONOSCOPY                   Pap:    Last Completed Pap Smear            PAP SMEAR (Every 3 Years) Next due on 10/27/2025      10/27/2022  LIQUID-BASED PAP SMEAR, [facility] ([facility])                   Mammogram:    Last Completed Mammogram            MAMMOGRAM (Every 2 Years) Next due on 7/18/2025 07/18/2023  Mammo Screening Digital Tomosynthesis Bilateral With CAD    03/25/2022  Mammo Screening Digital Tomosynthesis Bilateral With CAD    08/15/2019  Mammo Screening Digital Tomosynthesis Bilateral With CAD    07/27/2018  Mammo Screening Digital Tomosynthesis Bilateral With CAD    07/21/2017  Mammo Screening Digital Tomosynthesis Bilateral With CAD    Only the first 5 history entries have been loaded, but more history exists.                           Objective     Physical Exam:  Vital Signs:   Vitals:    11/30/23 0806   BP: 112/80   BP Location: Right arm   Patient Position: Sitting   Cuff Size: Adult   Pulse: 83   Resp: 16   Temp: 97.1 °F (36.2 °C)   TempSrc: Infrared   SpO2: 98%   Weight: 64.5 kg (142 lb 3.2 oz)   Height: 167.6 cm (66\")   PainSc:   6     Body mass index is 22.95 kg/m². BMI is within normal parameters. No other follow-up for BMI required.      Physical Exam  Vitals and nursing note reviewed.   Constitutional:       General: She is not in acute distress.     Appearance: Normal appearance. She is not ill-appearing.   HENT:      Head: Normocephalic.      Right Ear: Tympanic membrane, ear canal and external ear normal. There is no impacted cerumen.      Left Ear: Tympanic membrane, ear canal and external ear normal. There is no impacted cerumen.      Nose: Congestion present. No nasal tenderness or rhinorrhea.      Mouth/Throat:      Mouth: Mucous membranes are moist.      Pharynx: Oropharynx is clear. No oropharyngeal exudate or posterior " oropharyngeal erythema.   Eyes:      General:         Right eye: No discharge.         Left eye: No discharge.      Extraocular Movements: Extraocular movements intact.      Conjunctiva/sclera: Conjunctivae normal.      Pupils: Pupils are equal, round, and reactive to light.   Neck:      Thyroid: No thyromegaly.      Vascular: No carotid bruit.   Cardiovascular:      Rate and Rhythm: Normal rate and regular rhythm.      Pulses: Normal pulses.      Heart sounds: Normal heart sounds. No murmur heard.     No gallop.   Pulmonary:      Effort: Pulmonary effort is normal.      Breath sounds: Wheezing and rhonchi present. No rales.   Abdominal:      General: Bowel sounds are normal.      Palpations: Abdomen is soft. There is no mass.      Tenderness: There is no abdominal tenderness. There is no right CVA tenderness or left CVA tenderness.   Genitourinary:     Comments: BREAST EXAM: patient declines to have breast exam    PELVIC EXAM: exam declined by the patient   Musculoskeletal:         General: No swelling or tenderness. Normal range of motion.      Cervical back: Normal range of motion.      Right lower leg: No edema.      Left lower leg: No edema.   Lymphadenopathy:      Cervical: No cervical adenopathy.   Skin:     General: Skin is warm and dry.      Capillary Refill: Capillary refill takes less than 2 seconds.      Findings: No erythema or rash.   Neurological:      General: No focal deficit present.      Mental Status: She is alert and oriented to person, place, and time.      Motor: No weakness.   Psychiatric:         Mood and Affect: Mood normal.         Behavior: Behavior is cooperative.         Cognition and Memory: She does not exhibit impaired recent memory.         Procedures    Assessment / Plan      Assessment/Plan:   Diagnoses and all orders for this visit:    1. Wellness examination (Primary)  -     Comprehensive Metabolic Panel; Future  -     TSH; Future  -     CBC & Differential; Future    2.  Encounter for lipid screening for cardiovascular disease  -     Lipid Panel; Future    3. Tobacco use    4. Neuropathy     Continue gabapentin.     Xiomara Taylor  reports that she quit smoking about 6 months ago. Her smoking use included cigarettes. She has a 0.25 pack-year smoking history. She has never used smokeless tobacco.. I have educated her on the risk of diseases from using tobacco products such as cancer, COPD, and heart disease.     I advised her to quit and she is willing to quit. We have discussed the following method/s for tobacco cessation:  Education Material OTC Cessation Products including fake cigarettes; patches .  Together we have set a quit date for 1 week from today.  She will follow up with me in 6 months or sooner to check on her progress.    I spent 3  minutes counseling the patient.            Follow Up:   6 months     Healthcare Maintenance:   Counseling provided on     Health Maintenance, Female  Adopting a healthy lifestyle and getting preventive care can go a long way to promote health and wellness. Talk with your health care provider about what schedule of regular examinations is right for you. This is a good chance for you to check in with your provider about disease prevention and staying healthy.  In between checkups, there are plenty of things you can do on your own. Experts have done a lot of research about which lifestyle changes and preventive measures are most likely to keep you healthy. Ask your health care provider for more information.  Weight and diet  Eat a healthy diet  Be sure to include plenty of vegetables, fruits, low-fat dairy products, and lean protein.  Do not eat a lot of foods high in solid fats, added sugars, or salt.  Get regular exercise. This is one of the most important things you can do for your health.  Most adults should exercise for at least 150 minutes each week. The exercise should increase your heart rate and make you sweat (moderate-intensity  exercise).  Most adults should also do strengthening exercises at least twice a week. This is in addition to the moderate-intensity exercise.     Maintain a healthy weight  Body mass index (BMI) is a measurement that can be used to identify possible weight problems. It estimates body fat based on height and weight. Your health care provider can help determine your BMI and help you achieve or maintain a healthy weight.  For females 20 years of age and older:  A BMI below 18.5 is considered underweight.  A BMI of 18.5 to 24.9 is normal.  A BMI of 25 to 29.9 is considered overweight.  A BMI of 30 and above is considered obese.     Watch levels of cholesterol and blood lipids  You should start having your blood tested for lipids and cholesterol at 20 years of age, then have this test every 5 years.  You may need to have your cholesterol levels checked more often if:  Your lipid or cholesterol levels are high.  You are older than 50 years of age.  You are at high risk for heart disease.     Cancer screening  Lung Cancer  Lung cancer screening is recommended for adults 55-80 years old who are at high risk for lung cancer because of a history of smoking.  A yearly low-dose CT scan of the lungs is recommended for people who:  Currently smoke.  Have quit within the past 15 years.  Have at least a 30-pack-year history of smoking. A pack year is smoking an average of one pack of cigarettes a day for 1 year.  Yearly screening should continue until it has been 15 years since you quit.  Yearly screening should stop if you develop a health problem that would prevent you from having lung cancer treatment.     Breast Cancer  Practice breast self-awareness. This means understanding how your breasts normally appear and feel.  It also means doing regular breast self-exams. Let your health care provider know about any changes, no matter how small.  If you are in your 20s or 30s, you should have a clinical breast exam (CBE) by a health  care provider every 1-3 years as part of a regular health exam.  If you are 40 or older, have a CBE every year. Also consider having a breast X-ray (mammogram) every year.  If you have a family history of breast cancer, talk to your health care provider about genetic screening.  If you are at high risk for breast cancer, talk to your health care provider about having an MRI and a mammogram every year.  Breast cancer gene (BRCA) assessment is recommended for women who have family members with BRCA-related cancers. BRCA-related cancers include:  Breast.  Ovarian.  Tubal.  Peritoneal cancers.  Results of the assessment will determine the need for genetic counseling and BRCA1 and BRCA2 testing.     Cervical Cancer  Your health care provider may recommend that you be screened regularly for cancer of the pelvic organs (ovaries, uterus, and vagina). This screening involves a pelvic examination, including checking for microscopic changes to the surface of your cervix (Pap test). You may be encouraged to have this screening done every 3 years, beginning at age 21.  For women ages 30-65, health care providers may recommend pelvic exams and Pap testing every 3 years, or they may recommend the Pap and pelvic exam, combined with testing for human papilloma virus (HPV), every 5 years. Some types of HPV increase your risk of cervical cancer. Testing for HPV may also be done on women of any age with unclear Pap test results.  Other health care providers may not recommend any screening for nonpregnant women who are considered low risk for pelvic cancer and who do not have symptoms. Ask your health care provider if a screening pelvic exam is right for you.  If you have had past treatment for cervical cancer or a condition that could lead to cancer, you need Pap tests and screening for cancer for at least 20 years after your treatment. If Pap tests have been discontinued, your risk factors (such as having a new sexual partner) need to  be reassessed to determine if screening should resume. Some women have medical problems that increase the chance of getting cervical cancer. In these cases, your health care provider may recommend more frequent screening and Pap tests.     Colorectal Cancer  This type of cancer can be detected and often prevented.  Routine colorectal cancer screening usually begins at 50 years of age and continues through 75 years of age.  Your health care provider may recommend screening at an earlier age if you have risk factors for colon cancer.  Your health care provider may also recommend using home test kits to check for hidden blood in the stool.  A small camera at the end of a tube can be used to examine your colon directly (sigmoidoscopy or colonoscopy). This is done to check for the earliest forms of colorectal cancer.  Routine screening usually begins at age 50.  Direct examination of the colon should be repeated every 5-10 years through 75 years of age. However, you may need to be screened more often if early forms of precancerous polyps or small growths are found.     Skin Cancer  Check your skin from head to toe regularly.  Tell your health care provider about any new moles or changes in moles, especially if there is a change in a mole's shape or color.  Also tell your health care provider if you have a mole that is larger than the size of a pencil eraser.  Always use sunscreen. Apply sunscreen liberally and repeatedly throughout the day.  Protect yourself by wearing long sleeves, pants, a wide-brimmed hat, and sunglasses whenever you are outside.     Heart disease, diabetes, and high blood pressure  High blood pressure causes heart disease and increases the risk of stroke. High blood pressure is more likely to develop in:  People who have blood pressure in the high end of the normal range (130-139/85-89 mm Hg).  People who are overweight or obese.  People who are .  If you are 18-39 years of age, have  your blood pressure checked every 3-5 years. If you are 40 years of age or older, have your blood pressure checked every year. You should have your blood pressure measured twice--once when you are at a hospital or clinic, and once when you are not at a hospital or clinic. Record the average of the two measurements. To check your blood pressure when you are not at a hospital or clinic, you can use:  An automated blood pressure machine at a pharmacy.  A home blood pressure monitor.  If you are between 55 years and 79 years old, ask your health care provider if you should take aspirin to prevent strokes.  Have regular diabetes screenings. This involves taking a blood sample to check your fasting blood sugar level.  If you are at a normal weight and have a low risk for diabetes, have this test once every three years after 45 years of age.  If you are overweight and have a high risk for diabetes, consider being tested at a younger age or more often.  Preventing infection  Hepatitis B  If you have a higher risk for hepatitis B, you should be screened for this virus. You are considered at high risk for hepatitis B if:  You were born in a country where hepatitis B is common. Ask your health care provider which countries are considered high risk.  Your parents were born in a high-risk country, and you have not been immunized against hepatitis B (hepatitis B vaccine).  You have HIV or AIDS.  You use needles to inject street drugs.  You live with someone who has hepatitis B.  You have had sex with someone who has hepatitis B.  You get hemodialysis treatment.  You take certain medicines for conditions, including cancer, organ transplantation, and autoimmune conditions.     Hepatitis C  Blood testing is recommended for:  Everyone born from 1945 through 1965.  Anyone with known risk factors for hepatitis C.     Sexually transmitted infections (STIs)  You should be screened for sexually transmitted infections (STIs) including  gonorrhea and chlamydia if:  You are sexually active and are younger than 24 years of age.  You are older than 24 years of age and your health care provider tells you that you are at risk for this type of infection.  Your sexual activity has changed since you were last screened and you are at an increased risk for chlamydia or gonorrhea. Ask your health care provider if you are at risk.  If you do not have HIV, but are at risk, it may be recommended that you take a prescription medicine daily to prevent HIV infection. This is called pre-exposure prophylaxis (PrEP). You are considered at risk if:  You are sexually active and do not regularly use condoms or know the HIV status of your partner(s).  You take drugs by injection.  You are sexually active with a partner who has HIV.     Talk with your health care provider about whether you are at high risk of being infected with HIV. If you choose to begin PrEP, you should first be tested for HIV. You should then be tested every 3 months for as long as you are taking PrEP.  Pregnancy  If you are premenopausal and you may become pregnant, ask your health care provider about preconception counseling.  If you may become pregnant, take 400 to 800 micrograms (mcg) of folic acid every day.  If you want to prevent pregnancy, talk to your health care provider about birth control (contraception).  Osteoporosis and menopause  Osteoporosis is a disease in which the bones lose minerals and strength with aging. This can result in serious bone fractures. Your risk for osteoporosis can be identified using a bone density scan.  If you are 65 years of age or older, or if you are at risk for osteoporosis and fractures, ask your health care provider if you should be screened.  Ask your health care provider whether you should take a calcium or vitamin D supplement to lower your risk for osteoporosis.  Menopause may have certain physical symptoms and risks.  Hormone replacement therapy may  reduce some of these symptoms and risks.  Talk to your health care provider about whether hormone replacement therapy is right for you.  Follow these instructions at home:  Schedule regular health, dental, and eye exams.  Stay current with your immunizations.  Do not use any tobacco products including cigarettes, chewing tobacco, or electronic cigarettes.  If you are pregnant, do not drink alcohol.  If you are breastfeeding, limit how much and how often you drink alcohol.  Limit alcohol intake to no more than 1 drink per day for nonpregnant women. One drink equals 12 ounces of beer, 5 ounces of wine, or 1½ ounces of hard liquor.  Do not use street drugs.  Do not share needles.  Ask your health care provider for help if you need support or information about quitting drugs.  Tell your health care provider if you often feel depressed.  Tell your health care provider if you have ever been abused or do not feel safe at home.  This information is not intended to replace advice given to you by your health care provider. Make sure you discuss any questions you have with your health care provider.  Document Released: 07/02/2012 Document Revised: 05/25/2017 Document Reviewed: 09/20/2016  [employer] Interactive Patient Education © 2018 [employer] Xiomara Taylor voices understanding and acceptance of this advice and will call back with any further questions or concerns. AVS with preventive healthcare tips printed for patient.     VANESSA Teixeira  [facility]

## 2023-11-30 NOTE — PATIENT INSTRUCTIONS
MyPlate from USDA  MyPlate is an outline of a general healthy diet based on the Dietary Guidelines for Americans, 1256-9267, from the U.S. Department of Agriculture (USDA). It sets guidelines for how much food you should eat from each food group based on your age, sex, and level of physical activity.  What are tips for following MyPlate?  To follow MyPlate recommendations:  Eat a wide variety of fruits and vegetables, grains, and protein foods.  Serve smaller portions and eat less food throughout the day.  Limit portion sizes to avoid overeating.  Enjoy your food.  Get at least 150 minutes of exercise every week. This is about 30 minutes each day, 5 or more days per week.  It can be difficult to have every meal look like MyPlate. Think about MyPlate as eating guidelines for an entire day, rather than each individual meal.  Fruits and vegetables  Make one half of your plate fruits and vegetables.  Eat many different colors of fruits and vegetables each day.  For a 2,000-calorie daily food plan, eat:  2½ cups of vegetables every day.  2 cups of fruit every day.  1 cup is equal to:  1 cup raw or cooked vegetables.  1 cup raw fruit.  1 medium-sized orange, apple, or banana.  1 cup 100% fruit or vegetable juice.  2 cups raw leafy greens, such as lettuce, spinach, or kale.  ½ cup dried fruit.  Grains  One fourth of your plate should be grains.  Make at least half of the grains you eat each day whole grains.  For a 2,000-calorie daily food plan, eat 6 oz of grains every day.  1 oz is equal to:  1 slice bread.  1 cup cereal.  ½ cup cooked rice, cereal, or pasta.  Protein  One fourth of your plate should be protein.  Eat a wide variety of protein foods, including meat, poultry, fish, eggs, beans, nuts, and tofu.  For a 2,000-calorie daily food plan, eat 5½ oz of protein every day.  1 oz is equal to:  1 oz meat, poultry, or fish.  ¼ cup cooked beans.  1 egg.  ½ oz nuts or seeds.  1 Tbsp peanut butter.  Dairy  Drink fat-free  or low-fat (1%) milk.  Eat or drink dairy as a side to meals.  For a 2,000-calorie daily food plan, eat or drink 3 cups of dairy every day.  1 cup is equal to:  1 cup milk, yogurt, cottage cheese, or soy milk (soy beverage).  2 oz processed cheese.  1½ oz natural cheese.  Fats, oils, salt, and sugars  Only small amounts of oils are recommended.  Avoid foods that are high in calories and low in nutritional value (empty calories), like foods high in fat or added sugars.  Choose foods that are low in salt (sodium). Choose foods that have less than 140 milligrams (mg) of sodium per serving.  Drink water instead of sugary drinks. Drink enough fluid to keep your urine pale yellow.  Where to find support  Work with your health care provider or a dietitian to develop a customized eating plan that is right for you.  Download an concepcion (mobile application) to help you track your daily food intake.  Where to find more information  USDA: ChooseMyPlate.gov  Summary  MyPlate is a general guideline for healthy eating from the USDA. It is based on the Dietary Guidelines for Americans, 0811-4795.  In general, fruits and vegetables should take up one half of your plate, grains should take up one fourth of your plate, and protein should take up one fourth of your plate.  This information is not intended to replace advice given to you by your health care provider. Make sure you discuss any questions you have with your health care provider.  Document Revised: 11/08/2021 Document Reviewed: 11/08/2021  [employer] Patient Education © 2022 [employer].

## 2023-12-13 ENCOUNTER — LAB (OUTPATIENT)
Dept: INTERNAL MEDICINE | Facility: CLINIC | Age: 53
End: 2023-12-13
Payer: COMMERCIAL

## 2023-12-13 DIAGNOSIS — Z00.00 WELLNESS EXAMINATION: ICD-10-CM

## 2023-12-13 DIAGNOSIS — Z13.220 ENCOUNTER FOR LIPID SCREENING FOR CARDIOVASCULAR DISEASE: ICD-10-CM

## 2023-12-13 DIAGNOSIS — Z13.6 ENCOUNTER FOR LIPID SCREENING FOR CARDIOVASCULAR DISEASE: ICD-10-CM

## 2023-12-13 LAB
ALBUMIN SERPL-MCNC: 4 G/DL (ref 3.5–5.2)
ALBUMIN/GLOB SERPL: 1.6 G/DL
ALP SERPL-CCNC: 89 U/L (ref 39–117)
ALT SERPL W P-5'-P-CCNC: 13 U/L (ref 1–33)
ANION GAP SERPL CALCULATED.3IONS-SCNC: 12.4 MMOL/L (ref 5–15)
AST SERPL-CCNC: 16 U/L (ref 1–32)
BASOPHILS # BLD AUTO: 0.07 10*3/MM3 (ref 0–0.2)
BASOPHILS NFR BLD AUTO: 0.9 % (ref 0–1.5)
BILIRUB SERPL-MCNC: 0.3 MG/DL (ref 0–1.2)
BUN SERPL-MCNC: 11 MG/DL (ref 6–20)
BUN/CREAT SERPL: 14.9 (ref 7–25)
CALCIUM SPEC-SCNC: 9.3 MG/DL (ref 8.6–10.5)
CHLORIDE SERPL-SCNC: 105 MMOL/L (ref 98–107)
CHOLEST SERPL-MCNC: 244 MG/DL (ref 0–200)
CO2 SERPL-SCNC: 22.6 MMOL/L (ref 22–29)
CREAT SERPL-MCNC: 0.74 MG/DL (ref 0.57–1)
DEPRECATED RDW RBC AUTO: 43.5 FL (ref 37–54)
EGFRCR SERPLBLD CKD-EPI 2021: 97.5 ML/MIN/1.73
EOSINOPHIL # BLD AUTO: 0.24 10*3/MM3 (ref 0–0.4)
EOSINOPHIL NFR BLD AUTO: 3.2 % (ref 0.3–6.2)
ERYTHROCYTE [DISTWIDTH] IN BLOOD BY AUTOMATED COUNT: 13.1 % (ref 12.3–15.4)
GLOBULIN UR ELPH-MCNC: 2.5 GM/DL
GLUCOSE SERPL-MCNC: 85 MG/DL (ref 65–99)
HCT VFR BLD AUTO: 44.9 % (ref 34–46.6)
HDLC SERPL-MCNC: 74 MG/DL (ref 40–60)
HGB BLD-MCNC: 15.3 G/DL (ref 12–15.9)
IMM GRANULOCYTES # BLD AUTO: 0.01 10*3/MM3 (ref 0–0.05)
IMM GRANULOCYTES NFR BLD AUTO: 0.1 % (ref 0–0.5)
LDLC SERPL CALC-MCNC: 152 MG/DL (ref 0–100)
LDLC/HDLC SERPL: 2.02 {RATIO}
LYMPHOCYTES # BLD AUTO: 2.44 10*3/MM3 (ref 0.7–3.1)
LYMPHOCYTES NFR BLD AUTO: 32.6 % (ref 19.6–45.3)
MCH RBC QN AUTO: 30.8 PG (ref 26.6–33)
MCHC RBC AUTO-ENTMCNC: 34.1 G/DL (ref 31.5–35.7)
MCV RBC AUTO: 90.3 FL (ref 79–97)
MONOCYTES # BLD AUTO: 0.52 10*3/MM3 (ref 0.1–0.9)
MONOCYTES NFR BLD AUTO: 6.9 % (ref 5–12)
NEUTROPHILS NFR BLD AUTO: 4.21 10*3/MM3 (ref 1.7–7)
NEUTROPHILS NFR BLD AUTO: 56.3 % (ref 42.7–76)
NRBC BLD AUTO-RTO: 0 /100 WBC (ref 0–0.2)
PLATELET # BLD AUTO: 288 10*3/MM3 (ref 140–450)
PMV BLD AUTO: 8.7 FL (ref 6–12)
POTASSIUM SERPL-SCNC: 4.4 MMOL/L (ref 3.5–5.2)
PROT SERPL-MCNC: 6.5 G/DL (ref 6–8.5)
RBC # BLD AUTO: 4.97 10*6/MM3 (ref 3.77–5.28)
SODIUM SERPL-SCNC: 140 MMOL/L (ref 136–145)
TRIGL SERPL-MCNC: 103 MG/DL (ref 0–150)
TSH SERPL DL<=0.05 MIU/L-ACNC: 1.02 UIU/ML (ref 0.27–4.2)
VLDLC SERPL-MCNC: 18 MG/DL (ref 5–40)
WBC NRBC COR # BLD AUTO: 7.49 10*3/MM3 (ref 3.4–10.8)

## 2023-12-13 PROCEDURE — 80061 LIPID PANEL: CPT | Performed by: NURSE PRACTITIONER

## 2023-12-13 PROCEDURE — 80050 GENERAL HEALTH PANEL: CPT | Performed by: NURSE PRACTITIONER

## 2023-12-15 ENCOUNTER — TELEPHONE (OUTPATIENT)
Dept: INTERNAL MEDICINE | Facility: CLINIC | Age: 53
End: 2023-12-15
Payer: COMMERCIAL

## 2023-12-15 NOTE — TELEPHONE ENCOUNTER
----- Message from VANESSA Teixeira sent at 12/14/2023  4:40 PM EST -----  Cholesterol is a little elevated. You can improve your cholesterol by reducing sweets, carbohydrates, saturated fats and particularly white breads/ pasta, high fat dairy, and high starch foods, and increasing intake of whole grains, fruits, lean meats, legumes, seafood, poultry, nuts, and non-starchy vegetables.  The other labs are within normal limits.

## 2023-12-15 NOTE — TELEPHONE ENCOUNTER
I left a message on the patients voicemail to call our office back, office number provided.     HUB read:  Cholesterol is a little elevated. You can improve your cholesterol by reducing sweets, carbohydrates, saturated fats and particularly white breads/ pasta, high fat dairy, and high starch foods, and increasing intake of whole grains, fruits, lean meats, legumes, seafood, poultry, nuts, and non-starchy vegetables.   The other labs are within normal limits.

## 2023-12-15 NOTE — TELEPHONE ENCOUNTER
Name: Xiomara Taylor    Relationship: Self    Best Callback Number:      HUB PROVIDED THE RELAY MESSAGE FROM THE OFFICE   PATIENT VOICED UNDERSTANDING AND HAS NO FURTHER QUESTIONS AT THIS TIME    A

## 2023-12-28 DIAGNOSIS — B00.1 FEVER BLISTER: ICD-10-CM

## 2023-12-28 RX ORDER — VALACYCLOVIR HYDROCHLORIDE 500 MG/1
500 TABLET, FILM COATED ORAL DAILY
Qty: 30 TABLET | Refills: 5 | Status: SHIPPED | OUTPATIENT
Start: 2023-12-28

## 2024-01-08 RX ORDER — ALBUTEROL SULFATE 90 UG/1
2 AEROSOL, METERED RESPIRATORY (INHALATION) EVERY 4 HOURS PRN
Qty: 18 G | Refills: 0 | Status: SHIPPED | OUTPATIENT
Start: 2024-01-08

## 2024-01-08 RX ORDER — ALBUTEROL SULFATE 90 UG/1
2 AEROSOL, METERED RESPIRATORY (INHALATION) EVERY 4 HOURS PRN
Qty: 8 G | Refills: 3 | OUTPATIENT
Start: 2024-01-08

## 2024-02-03 DIAGNOSIS — G62.9 NEUROPATHY: ICD-10-CM

## 2024-02-05 DIAGNOSIS — G62.9 NEUROPATHY: ICD-10-CM

## 2024-02-05 RX ORDER — GABAPENTIN 300 MG/1
300 CAPSULE ORAL 3 TIMES DAILY
Qty: 90 CAPSULE | Refills: 2 | OUTPATIENT
Start: 2024-02-05

## 2024-02-05 RX ORDER — GABAPENTIN 300 MG/1
300 CAPSULE ORAL 3 TIMES DAILY
Qty: 90 CAPSULE | OUTPATIENT
Start: 2024-02-05

## 2024-02-05 RX ORDER — GABAPENTIN 300 MG/1
300 CAPSULE ORAL 3 TIMES DAILY
Qty: 90 CAPSULE | Refills: 2 | Status: SHIPPED | OUTPATIENT
Start: 2024-02-05

## 2024-02-05 NOTE — TELEPHONE ENCOUNTER
Caller: Xiomara Taylor    Relationship: Self    Best call back number: [phone]     Requested Prescriptions:   Requested Prescriptions     Pending Prescriptions Disp Refills    gabapentin (NEURONTIN) 300 MG capsule 90 capsule 2     Sig: Take 1 capsule by mouth 3 (Three) Times a Day.        Pharmacy where request should be sent: [facility] [id number] [place] [id number] [facility] AT [address] [phone] [facility] [phone]      Last office visit with prescribing clinician: 11/30/2023   Last telemedicine visit with prescribing clinician: Visit date not found   Next office visit with prescribing clinician: 5/30/2024     Additional details provided by patient: PATIENT HAS BEEN OUT OF MEDICATION SINCE 2/3/24. PATIENT HAS RECEIVED NOTIFICATION THAT THE MEDICATION WAS DENIED DUE TO DUPLICATE REQUESTS BUT THE PHARMACY HAS NOT RECEIVED A REFILL FOR THE MEDICATION.     Does the patient have less than a 3 day supply:  [x] Yes  [] No    Would you like a call back once the refill request has been completed: [] Yes [x] No    If the office needs to give you a call back, can they leave a voicemail: [] Yes [x] No    Nevaeh Hanks Rep   02/05/24 14:22 EST

## 2024-04-18 ENCOUNTER — TELEPHONE (OUTPATIENT)
Dept: INTERNAL MEDICINE | Facility: CLINIC | Age: 54
End: 2024-04-18
Payer: COMMERCIAL

## 2024-04-18 NOTE — TELEPHONE ENCOUNTER
Caller: Xiomara Taylor    Relationship to patient: Self    Best call back number: [phone]     Type of visit: [employer] VIDEO VISIT    Requested date: 5/30/24     If rescheduling, when is the original appointment: 5/30/24     Additional notes:PATIENT IS REQUESTING FOR THIS APPOINTMENT TO A VIRTUAL VISIT INSTEAD OF AN IN OFFICE VISIT.    PATIENT WOULD STILL PREFER THE 8:00 SLOT IF POSSIBLE.     PLEASE ADVISE

## 2024-05-08 DIAGNOSIS — G62.9 NEUROPATHY: ICD-10-CM

## 2024-05-09 RX ORDER — GABAPENTIN 300 MG/1
300 CAPSULE ORAL 3 TIMES DAILY
Qty: 90 CAPSULE | Refills: 2 | OUTPATIENT
Start: 2024-05-09

## 2024-05-09 RX ORDER — GABAPENTIN 300 MG/1
300 CAPSULE ORAL 3 TIMES DAILY
Qty: 90 CAPSULE | Refills: 2 | Status: SHIPPED | OUTPATIENT
Start: 2024-05-09

## 2024-05-29 DIAGNOSIS — G62.9 NEUROPATHY: ICD-10-CM

## 2024-05-29 NOTE — TELEPHONE ENCOUNTER
Called patient to re-saira appt 06.04.2024. I was able to get her saira for 06.10.2024, but patient stated she will need an RX on gabapentin and Mobic before then. Please advise.

## 2024-05-30 RX ORDER — MELOXICAM 15 MG/1
15 TABLET ORAL DAILY
Qty: 90 TABLET | Refills: 1 | Status: SHIPPED | OUTPATIENT
Start: 2024-05-30

## 2024-05-30 RX ORDER — GABAPENTIN 300 MG/1
300 CAPSULE ORAL 3 TIMES DAILY
Qty: 90 CAPSULE | Refills: 0 | Status: SHIPPED | OUTPATIENT
Start: 2024-05-30

## 2024-05-30 RX ORDER — MELOXICAM 15 MG/1
15 TABLET ORAL DAILY
Qty: 90 TABLET | Refills: 0 | Status: SHIPPED | OUTPATIENT
Start: 2024-05-30

## 2024-06-10 ENCOUNTER — TELEMEDICINE (OUTPATIENT)
Dept: INTERNAL MEDICINE | Facility: CLINIC | Age: 54
End: 2024-06-10
Payer: COMMERCIAL

## 2024-06-10 VITALS — BODY MASS INDEX: 21.93 KG/M2 | WEIGHT: 140 LBS

## 2024-06-10 DIAGNOSIS — M15.9 PRIMARY OSTEOARTHRITIS INVOLVING MULTIPLE JOINTS: ICD-10-CM

## 2024-06-10 DIAGNOSIS — Z72.0 TOBACCO USE: ICD-10-CM

## 2024-06-10 DIAGNOSIS — G62.9 NEUROPATHY: Primary | ICD-10-CM

## 2024-06-10 PROCEDURE — 99214 OFFICE O/P EST MOD 30 MIN: CPT | Performed by: NURSE PRACTITIONER

## 2024-06-10 NOTE — PROGRESS NOTES
Patient Name: Xiomara Taylor  : 1970   MRN: [id number]     Chief Complaint:    Chief Complaint   Patient presents with    Peripheral Neuropathy     6 month follow up.        History of Present Illness: Xiomara Taylor is a 53 y.o. female.  History of Present Illness  The patient presents for follow-up for neuropathy.    The patient is experiencing right-sided neuropathy, predominantly affecting her 4th and 5th fingers. She has been consistently taking gabapentin 300 mg thrice daily for several years, which has proven beneficial in managing her pain. However, she experiences swelling if the medication is not taken. Additionally, she uses CBD drops and cream for symptom management. She reports no excessive drowsiness from the medication and is sleeping well, which effectively manages her symptoms.    The patient also suffers from arthritis in both feet and knees. She is currently on a daily regimen of meloxicam, which she tolerates well without any adverse effects such as upset stomach or bleeding.    The patient has recently resumed her smoking habit, with a 32-pack-year history. She expresses a desire to be proactive in undergoing lung cancer screening.     You have chosen to receive care through a telehealth visit. Patient consents to video/audio connection for your medical care today.   This visit completed via [employer].    Patient is in [place], [place] and provider is at [facility] [place] [id number].      Subjective     Review of System: Review of Systems     Medications:     Current Outpatient Medications:     albuterol (PROVENTIL) (2.5 MG/3ML) 0.083% nebulizer solution, Take 2.5 mg by nebulization Every 4 (Four) Hours As Needed for Wheezing., Disp: 90 each, Rfl: 11    albuterol sulfate  (90 Base) MCG/ACT inhaler, INHALE TWO PUFFS BY MOUTH EVERY 4 HOURS AS NEEDED FOR WHEEZING, Disp: 18 g, Rfl: 0    CBD (cannabidiol) oral oil,  cannabidiol (CBD) oral oil  Take by oral route., Disp: , Rfl:     fexofenadine-pseudoephedrine (ALLEGRA-D 24) 180-240 MG per 24 hr tablet, Take 1 tablet by mouth Daily., Disp: , Rfl:     Flowflex COVID-19 Ag Home Test kit, , Disp: , Rfl:     gabapentin (NEURONTIN) 300 MG capsule, Take 1 capsule by mouth 3 (Three) Times a Day., Disp: 90 capsule, Rfl: 0    lamoTRIgine (LaMICtal) 200 MG tablet, , Disp: , Rfl:     meloxicam (MOBIC) 15 MG tablet, Take 1 tablet by mouth Daily., Disp: 90 tablet, Rfl: 0    meloxicam (MOBIC) 15 MG tablet, TAKE 1 TABLET BY MOUTH DAILY, Disp: 90 tablet, Rfl: 1    propranolol (INDERAL) 10 MG tablet, Take 1 tablet by mouth 2 (Two) Times a Day., Disp: , Rfl:     QUEtiapine (SEROquel) 100 MG tablet, , Disp: , Rfl:     valACYclovir (VALTREX) 500 MG tablet, TAKE 1 TABLET BY MOUTH DAILY, Disp: 30 tablet, Rfl: 5    Allergies:   Allergies   Allergen Reactions    Augmentin [Amoxicillin-Pot Clavulanate] Hives    Diflucan [Fluconazole] Hives    Ketamine Anaphylaxis       Objective     Physical Exam:   Vital Signs:   Vitals:    06/10/24 1307   Weight: 63.5 kg (140 lb)   PainSc: 0-No pain     Body mass index is 21.93 kg/m². BMI is within normal parameters. No other follow-up for BMI required.      Physical Exam  Constitutional:       Appearance: She is not ill-appearing.   Pulmonary:      Effort: Pulmonary effort is normal.   Neurological:      General: No focal deficit present.      Mental Status: She is oriented to person, place, and time.   Psychiatric:         Mood and Affect: Mood normal.     Physical Exam         Results       Assessment / Plan      Assessment/Plan:   Diagnoses and all orders for this visit:    1. Neuropathy (Primary)    2. Tobacco use  -     CT Chest Low Dose Wo; Future    3. Primary osteoarthritis involving multiple joints       Assessment & Plan  1. Neuropathy/osteoarthritis .  The patient's CSA has been updated through [employer]. The current treatment plan of gabapentin 300 mg TID   will be maintained, which has proven to be effective and the patient has been tolerating it well. The patient will also continue her meloxicam regimen, taking the lowest dose that is tolerated and effective. The potential side effects, risks, and benefits have been thoroughly discussed.      The patient has been advised on the side effects of their medication, including the avoidance of alcohol and illegal substances with their medications. The risk of tolerance, dependence, and addiction was discussed.  The patient has been advised to take the lowest dosage of the medication at the lowest frequency, and for the shortest period of time possible.  The patient has been advised not to receive controlled substances or narcotics from other doctors and not to sell or give away the medication. CSA donny and Denis reviewed.     2. Lung cancer screening.  A low-dose CT lung cancer screening was discussed as a potential treatment option. The patient expressed her willingness to proceed with the screening today. She has also expressed a desire to cease smoking. She has nicotine patches and plans to commence using them next week. An order for lung cancer screening has been placed.    Follow-up  The patient is scheduled for a follow-up visit in 6 months.     Xiomara Taylor  reports smoking on and off. She has started smoking again. She has a 32 pack-year smoking history. She has never used smokeless tobacco. I have educated her on the risk of diseases from using tobacco products such as cancer, COPD, and heart disease.     I advised her to quit and she is willing to quit. We have discussed the following method/s for tobacco cessation:  OTC Cessation Products.  Together we have set a quit date for 1 week from today.  She will follow up with me in 6 months or sooner to check on her progress.    I spent 3  minutes counseling the patient.              VANESSA Teixeira  [facility]

## 2024-06-22 ENCOUNTER — HOSPITAL ENCOUNTER (OUTPATIENT)
Dept: CT IMAGING | Facility: HOSPITAL | Age: 54
Discharge: HOME | End: 2024-06-22
Admitting: NURSE PRACTITIONER
Payer: COMMERCIAL

## 2024-06-22 DIAGNOSIS — Z72.0 TOBACCO USE: ICD-10-CM

## 2024-06-22 PROCEDURE — 71271 CT THORAX LUNG CANCER SCR C-: CPT

## 2024-07-11 ENCOUNTER — PATIENT MESSAGE (OUTPATIENT)
Dept: INTERNAL MEDICINE | Facility: CLINIC | Age: 54
End: 2024-07-11
Payer: COMMERCIAL

## 2024-07-11 DIAGNOSIS — B00.1 FEVER BLISTER: ICD-10-CM

## 2024-07-11 DIAGNOSIS — M25.519 SHOULDER PAIN, UNSPECIFIED CHRONICITY, UNSPECIFIED LATERALITY: Primary | ICD-10-CM

## 2024-07-12 ENCOUNTER — TELEPHONE (OUTPATIENT)
Dept: INTERNAL MEDICINE | Facility: CLINIC | Age: 54
End: 2024-07-12
Payer: COMMERCIAL

## 2024-07-12 RX ORDER — METHYLPREDNISOLONE 4 MG/1
TABLET ORAL
Qty: 21 TABLET | Refills: 0 | Status: SHIPPED | OUTPATIENT
Start: 2024-07-12

## 2024-07-12 RX ORDER — VALACYCLOVIR HYDROCHLORIDE 500 MG/1
500 TABLET, FILM COATED ORAL DAILY
Qty: 30 TABLET | Refills: 5 | Status: SHIPPED | OUTPATIENT
Start: 2024-07-12

## 2024-07-12 NOTE — TELEPHONE ENCOUNTER
LOV 11/30/2023  NOV Not scheduled yet  Return in about 6 months (around 5/30/2024).     We recently received your message to refill your medication(s).  Our records indicate that you did not schedule a follow up appointment with your provider at your last visit on 11/30/2023. The medication that you are on requires a follow up appointment for additional refills. Patient was to schedule on or around 05/30/2024 for 6 month follow up

## 2024-07-12 NOTE — TELEPHONE ENCOUNTER
Caller: Xiomara Taylor    Relationship: Self    Best call back number:[phone]     What is the best time to reach you: ANYTIME AFTER 10:30    Who are you requesting to speak with (clinical staff, provider,  specific staff member): CLINICAL STAFF    What was the call regarding: THE PATIENT REQUESTED A REFILL OF VALACYCLOVIR AND IT WAS DENIED STATING THAT SHE NEEDED A FOLLOW UP APPOINTMENT. SHE WAS SEEN BY PCP ON 06/10/24.

## 2024-07-12 NOTE — TELEPHONE ENCOUNTER
From: Xiomara Taylor  To: Philomena Healy  Sent: 7/11/2024 3:27 PM EDT  Subject: Frozen shoulder    Good Afternoon, I have been dealing with frozen shoulder for more than a month now. I have been doing at home PT exercises that I have done in the past with this issue. I have been so close to it being completely healed twice , but I just tweaked it again here at work. Is it possible for you to prescribe a round of steroids to hopefully get it over the hump? Also, what month do you need me to book my next normal follow up visit for med refill appt? Thanks so much, have a great day!

## 2024-07-16 ENCOUNTER — TRANSCRIBE ORDERS (OUTPATIENT)
Dept: ADMINISTRATIVE | Facility: HOSPITAL | Age: 54
End: 2024-07-16
Payer: COMMERCIAL

## 2024-07-16 DIAGNOSIS — Z12.31 VISIT FOR SCREENING MAMMOGRAM: Primary | ICD-10-CM

## 2024-07-17 DIAGNOSIS — Z98.890 H/O CERVICAL DISCECTOMY: Primary | ICD-10-CM

## 2024-07-17 DIAGNOSIS — M54.2 NECK PAIN: ICD-10-CM

## 2024-08-08 ENCOUNTER — HOSPITAL ENCOUNTER (OUTPATIENT)
Dept: GENERAL RADIOLOGY | Facility: HOSPITAL | Age: 54
Discharge: HOME | End: 2024-08-08
Admitting: NURSE PRACTITIONER
Payer: COMMERCIAL

## 2024-08-08 DIAGNOSIS — Z98.890 H/O CERVICAL DISCECTOMY: ICD-10-CM

## 2024-08-08 DIAGNOSIS — M54.2 NECK PAIN: ICD-10-CM

## 2024-08-08 PROCEDURE — 72040 X-RAY EXAM NECK SPINE 2-3 VW: CPT

## 2024-08-17 ENCOUNTER — HOSPITAL ENCOUNTER (OUTPATIENT)
Dept: MAMMOGRAPHY | Facility: HOSPITAL | Age: 54
Discharge: HOME | End: 2024-08-17
Admitting: NURSE PRACTITIONER
Payer: COMMERCIAL

## 2024-08-17 DIAGNOSIS — Z12.31 VISIT FOR SCREENING MAMMOGRAM: ICD-10-CM

## 2024-08-17 LAB
NCCN CRITERIA FLAG: NORMAL
TYRER CUZICK SCORE: 9.6

## 2024-08-17 PROCEDURE — 77063 BREAST TOMOSYNTHESIS BI: CPT

## 2024-08-17 PROCEDURE — 77067 SCR MAMMO BI INCL CAD: CPT

## 2024-09-14 DIAGNOSIS — G62.9 NEUROPATHY: ICD-10-CM

## 2024-09-18 RX ORDER — GABAPENTIN 300 MG/1
300 CAPSULE ORAL 3 TIMES DAILY
Qty: 90 CAPSULE | Refills: 2 | Status: SHIPPED | OUTPATIENT
Start: 2024-09-18

## 2024-12-11 ENCOUNTER — OFFICE VISIT (OUTPATIENT)
Dept: INTERNAL MEDICINE | Facility: CLINIC | Age: 54
End: 2024-12-11
Payer: COMMERCIAL

## 2024-12-11 VITALS
DIASTOLIC BLOOD PRESSURE: 80 MMHG | TEMPERATURE: 97.7 F | BODY MASS INDEX: 23.82 KG/M2 | WEIGHT: 148.2 LBS | SYSTOLIC BLOOD PRESSURE: 132 MMHG | HEIGHT: 66 IN | RESPIRATION RATE: 16 BRPM | HEART RATE: 80 BPM

## 2024-12-11 DIAGNOSIS — Z13.6 ENCOUNTER FOR LIPID SCREENING FOR CARDIOVASCULAR DISEASE: ICD-10-CM

## 2024-12-11 DIAGNOSIS — G62.9 NEUROPATHY: ICD-10-CM

## 2024-12-11 DIAGNOSIS — M54.2 NECK PAIN: ICD-10-CM

## 2024-12-11 DIAGNOSIS — Z13.220 ENCOUNTER FOR LIPID SCREENING FOR CARDIOVASCULAR DISEASE: ICD-10-CM

## 2024-12-11 DIAGNOSIS — M15.0 PRIMARY OSTEOARTHRITIS INVOLVING MULTIPLE JOINTS: ICD-10-CM

## 2024-12-11 DIAGNOSIS — M25.519 SHOULDER PAIN, UNSPECIFIED CHRONICITY, UNSPECIFIED LATERALITY: ICD-10-CM

## 2024-12-11 DIAGNOSIS — Z00.00 ENCOUNTER FOR WELLNESS EXAMINATION: Primary | ICD-10-CM

## 2024-12-11 DIAGNOSIS — Z72.0 TOBACCO USE: ICD-10-CM

## 2024-12-11 LAB
ALBUMIN SERPL-MCNC: 4.1 G/DL (ref 3.5–5.2)
ALBUMIN/GLOB SERPL: 1.5 G/DL
ALP SERPL-CCNC: 84 U/L (ref 39–117)
ALT SERPL W P-5'-P-CCNC: 10 U/L (ref 1–33)
ANION GAP SERPL CALCULATED.3IONS-SCNC: 10.7 MMOL/L (ref 5–15)
AST SERPL-CCNC: 17 U/L (ref 1–32)
BASOPHILS # BLD AUTO: 0.05 10*3/MM3 (ref 0–0.2)
BASOPHILS NFR BLD AUTO: 0.7 % (ref 0–1.5)
BILIRUB SERPL-MCNC: 0.2 MG/DL (ref 0–1.2)
BUN SERPL-MCNC: 13 MG/DL (ref 6–20)
BUN/CREAT SERPL: 15.9 (ref 7–25)
CALCIUM SPEC-SCNC: 9.1 MG/DL (ref 8.6–10.5)
CHLORIDE SERPL-SCNC: 104 MMOL/L (ref 98–107)
CHOLEST SERPL-MCNC: 210 MG/DL (ref 0–200)
CO2 SERPL-SCNC: 25.3 MMOL/L (ref 22–29)
CREAT SERPL-MCNC: 0.82 MG/DL (ref 0.57–1)
DEPRECATED RDW RBC AUTO: 40.4 FL (ref 37–54)
EGFRCR SERPLBLD CKD-EPI 2021: 85.7 ML/MIN/1.73
EOSINOPHIL # BLD AUTO: 0.11 10*3/MM3 (ref 0–0.4)
EOSINOPHIL NFR BLD AUTO: 1.5 % (ref 0.3–6.2)
ERYTHROCYTE [DISTWIDTH] IN BLOOD BY AUTOMATED COUNT: 12.3 % (ref 12.3–15.4)
GLOBULIN UR ELPH-MCNC: 2.8 GM/DL
GLUCOSE SERPL-MCNC: 86 MG/DL (ref 65–99)
HCT VFR BLD AUTO: 42.5 % (ref 34–46.6)
HDLC SERPL-MCNC: 69 MG/DL (ref 40–60)
HGB BLD-MCNC: 14.9 G/DL (ref 12–15.9)
IMM GRANULOCYTES # BLD AUTO: 0.02 10*3/MM3 (ref 0–0.05)
IMM GRANULOCYTES NFR BLD AUTO: 0.3 % (ref 0–0.5)
LDLC SERPL CALC-MCNC: 128 MG/DL (ref 0–100)
LDLC/HDLC SERPL: 1.83 {RATIO}
LYMPHOCYTES # BLD AUTO: 2.14 10*3/MM3 (ref 0.7–3.1)
LYMPHOCYTES NFR BLD AUTO: 29.3 % (ref 19.6–45.3)
MCH RBC QN AUTO: 31.2 PG (ref 26.6–33)
MCHC RBC AUTO-ENTMCNC: 35.1 G/DL (ref 31.5–35.7)
MCV RBC AUTO: 88.9 FL (ref 79–97)
MONOCYTES # BLD AUTO: 0.59 10*3/MM3 (ref 0.1–0.9)
MONOCYTES NFR BLD AUTO: 8.1 % (ref 5–12)
NEUTROPHILS NFR BLD AUTO: 4.4 10*3/MM3 (ref 1.7–7)
NEUTROPHILS NFR BLD AUTO: 60.1 % (ref 42.7–76)
NRBC BLD AUTO-RTO: 0 /100 WBC (ref 0–0.2)
PLATELET # BLD AUTO: 271 10*3/MM3 (ref 140–450)
PMV BLD AUTO: 9.1 FL (ref 6–12)
POTASSIUM SERPL-SCNC: 4.3 MMOL/L (ref 3.5–5.2)
PROT SERPL-MCNC: 6.9 G/DL (ref 6–8.5)
RBC # BLD AUTO: 4.78 10*6/MM3 (ref 3.77–5.28)
SODIUM SERPL-SCNC: 140 MMOL/L (ref 136–145)
TRIGL SERPL-MCNC: 72 MG/DL (ref 0–150)
TSH SERPL DL<=0.05 MIU/L-ACNC: 0.56 UIU/ML (ref 0.27–4.2)
VLDLC SERPL-MCNC: 13 MG/DL (ref 5–40)
WBC NRBC COR # BLD AUTO: 7.31 10*3/MM3 (ref 3.4–10.8)

## 2024-12-11 PROCEDURE — 80050 GENERAL HEALTH PANEL: CPT | Performed by: NURSE PRACTITIONER

## 2024-12-11 PROCEDURE — 99396 PREV VISIT EST AGE 40-64: CPT | Performed by: NURSE PRACTITIONER

## 2024-12-11 PROCEDURE — 80061 LIPID PANEL: CPT | Performed by: NURSE PRACTITIONER

## 2024-12-11 RX ORDER — MELOXICAM 15 MG/1
15 TABLET ORAL DAILY
Qty: 90 TABLET | Refills: 1 | Status: SHIPPED | OUTPATIENT
Start: 2024-12-11

## 2024-12-11 NOTE — PROGRESS NOTES
Female Physical Note      Patient Name: Xiomara Taylor  : 1970   MRN: [id number]     Chief Complaint:    Chief Complaint   Patient presents with    Annual Exam       History of Present Illness: Xiomara Taylor is a 54 y.o. female who is here today for their annual health maintenance and physical.  History of Present Illness  The patient is a 54-year-old female who presents for evaluation of right-sided neuropathy, arthritis, smoking cessation, and health maintenance.    She has a history of right-sided neuropathy predominantly affecting her fourth and fifth fingers. She has been consistently taking gabapentin 300 mg 3 times a day, which has been beneficial in managing her pain. However, if not taken, she experiences some breakthrough pain. Depending on her job as a , she may have intermittent flares, which CBD has been effective for, especially her frozen shoulder issues. She reports no excessive drowsiness from the medication and is sleeping well, which effectively manages her symptoms further.    She also has arthritis in both feet and knees, for which she utilizes meloxicam daily. She tolerates this medication well without any adverse effects such as stomach upset, pain, or bleeding.    She smokes but is recently trying to quit and is utilizing nicotine gum. Annual CT lung cancer screening was completed yearly with no suspicious pulmonary nodules.    She has yearly skin checks with dermatology and follows up with psychiatry. Mammogram and colonoscopy have been completed. She is very active with her job but would benefit from strength training to increase muscle mass, which can be helpful postmenopause.    SOCIAL HISTORY  She smokes but is trying to quit and is using nicotine gum.    MEDICATIONS  Gabapentin, meloxicam, CBD, nicotine gum.    Are you currently seeing any other doctors or specialists? dermatology, psychiatry      Subjective     Review of System: Review of Systems   A review of  systems was performed, and the pertinent positives are noted in the HPI.      Past Medical History:   Past Medical History:   Diagnosis Date    Anxiety     Arthritis     Bipolar 1 disorder     Depression     Pancreatitis        Past Surgical History:   Past Surgical History:   Procedure Laterality Date    CERVICAL DISCECTOMY ANTERIOR      KIDNEY STONE SURGERY      THORACIC DISCECTOMY      URETEROSCOPY LASER LITHOTRIPSY WITH STENT INSERTION         Family History:   Family History   Problem Relation Age of Onset    Hypertension Mother     Arthritis Mother     Mental illness Maternal Aunt     Hypertension Maternal Aunt     Diabetes Maternal Aunt     Cancer Maternal Aunt     Heart disease Maternal Uncle     Diabetes Maternal Uncle     Mental illness Maternal Grandmother     Breast cancer Other         age unknown    Ovarian cancer Neg Hx        Social History:   Social History     Socioeconomic History    Marital status: Single   Tobacco Use    Smoking status: Every Day     Current packs/day: 1.00     Average packs/day: 0.8 packs/day for 1.5 years (1.3 ttl pk-yrs)     Types: Cigarettes     Start date: 11/16/2022     Last attempt to quit: 5/17/2023    Smokeless tobacco: Never   Vaping Use    Vaping status: Never Used   Substance and Sexual Activity    Alcohol use: Not Currently     Comment: quit in 2014    Drug use: Never    Sexual activity: Not Currently       Medications:     Current Outpatient Medications:     albuterol (PROVENTIL) (2.5 MG/3ML) 0.083% nebulizer solution, Take 2.5 mg by nebulization Every 4 (Four) Hours As Needed for Wheezing., Disp: 90 each, Rfl: 11    albuterol sulfate  (90 Base) MCG/ACT inhaler, INHALE TWO PUFFS BY MOUTH EVERY 4 HOURS AS NEEDED FOR WHEEZING, Disp: 18 g, Rfl: 0    fexofenadine-pseudoephedrine (ALLEGRA-D 24) 180-240 MG per 24 hr tablet, Take 1 tablet by mouth Daily., Disp: , Rfl:     gabapentin (NEURONTIN) 300 MG capsule, TAKE 1 CAPSULE BY MOUTH 3 TIMES A DAY, Disp: 90  capsule, Rfl: 2    lamoTRIgine (LaMICtal) 200 MG tablet, , Disp: , Rfl:     meloxicam (MOBIC) 15 MG tablet, Take 1 tablet by mouth Daily., Disp: 90 tablet, Rfl: 1    methylPREDNISolone (MEDROL) 4 MG dose pack, Take as directed on package instructions., Disp: 21 tablet, Rfl: 0    propranolol (INDERAL) 10 MG tablet, Take 1 tablet by mouth 2 (Two) Times a Day., Disp: , Rfl:     QUEtiapine (SEROquel) 100 MG tablet, , Disp: , Rfl:     valACYclovir (VALTREX) 500 MG tablet, TAKE 1 TABLET BY MOUTH DAILY, Disp: 30 tablet, Rfl: 5    CBD oil (cannabidiol) capsule, Take 1.5 each by mouth Daily., Disp: , Rfl:     Allergies:   Allergies   Allergen Reactions    Augmentin [Amoxicillin-Pot Clavulanate] Hives    Diflucan [Fluconazole] Hives    Ketamine Anaphylaxis       Reviewed immunization history and updated state vaccination form as needed. Patient was counseled on Zoster     PHQ-2 Depression Screening  Little interest or pleasure in doing things? Not at all   Feeling down, depressed, or hopeless? Not at all   PHQ-2 Total Score 0     Colorectal Screening:     Last Completed Colonoscopy            COLORECTAL CANCER SCREENING (COLONOSCOPY - Every 5 Years) Next due on 1/18/2028 01/18/2023  SCANNED - COLONOSCOPY                   Pap:    Last Completed Pap Smear            PAP SMEAR (Every 3 Years) Next due on 10/27/2025      10/27/2022  LIQUID-BASED PAP SMEAR, [facility] ([facility])                   Mammogram:    Last Completed Mammogram            MAMMOGRAM (Every 2 Years) Next due on 8/17/2026 08/17/2024  Mammo Screening Digital Tomosynthesis Bilateral With CAD    07/18/2023  Mammo Screening Digital Tomosynthesis Bilateral With CAD    03/25/2022  Mammo Screening Digital Tomosynthesis Bilateral With CAD    08/15/2019  Mammo Screening Digital Tomosynthesis Bilateral With CAD    07/27/2018  Mammo Screening Digital Tomosynthesis Bilateral With CAD    Only the first 5 history entries have been loaded, but more history  "exists.                        Objective     Physical Exam:  Vital Signs:   Vitals:    12/11/24 0809   BP: 132/80   BP Location: Right arm   Patient Position: Sitting   Cuff Size: Adult   Pulse: 80   Resp: 16   Temp: 97.7 °F (36.5 °C)   TempSrc: Infrared   Weight: 67.2 kg (148 lb 3.2 oz)   Height: 167.6 cm (66\")   PainSc:   2     Body mass index is 23.92 kg/m². BMI is within normal parameters. No other follow-up for BMI required.        Physical Exam  Vitals and nursing note reviewed.   Constitutional:       General: She is not in acute distress.     Appearance: Normal appearance. She is not ill-appearing.   HENT:      Head: Normocephalic.      Right Ear: Tympanic membrane, ear canal and external ear normal. There is no impacted cerumen.      Left Ear: Tympanic membrane, ear canal and external ear normal. There is no impacted cerumen.      Nose: No nasal tenderness or rhinorrhea.      Mouth/Throat:      Mouth: Mucous membranes are moist.      Pharynx: Oropharynx is clear. No oropharyngeal exudate or posterior oropharyngeal erythema.   Eyes:      General:         Right eye: No discharge.         Left eye: No discharge.      Extraocular Movements: Extraocular movements intact.      Conjunctiva/sclera: Conjunctivae normal.      Pupils: Pupils are equal, round, and reactive to light.   Neck:      Thyroid: No thyromegaly.      Vascular: No carotid bruit.   Cardiovascular:      Rate and Rhythm: Normal rate and regular rhythm.      Pulses: Normal pulses.      Heart sounds: Normal heart sounds. No murmur heard.     No gallop.   Pulmonary:      Effort: Pulmonary effort is normal.      Breath sounds: Normal breath sounds. No wheezing, rhonchi or rales.   Abdominal:      General: Bowel sounds are normal.      Palpations: Abdomen is soft. There is no mass.      Tenderness: There is no abdominal tenderness. There is no right CVA tenderness or left CVA tenderness.   Genitourinary:     Comments: BREAST EXAM: patient declines to " have breast exam    PELVIC EXAM: exam declined by the patient   Musculoskeletal:         General: No swelling or tenderness. Normal range of motion.      Cervical back: Normal range of motion.      Right lower leg: No edema.      Left lower leg: No edema.   Lymphadenopathy:      Cervical: No cervical adenopathy.   Skin:     General: Skin is warm and dry.      Capillary Refill: Capillary refill takes less than 2 seconds.      Findings: No erythema or rash.      Comments: No suspicious skin lesions   Neurological:      General: No focal deficit present.      Mental Status: She is alert and oriented to person, place, and time.      Motor: No weakness.   Psychiatric:         Mood and Affect: Mood normal.         Behavior: Behavior is cooperative.         Cognition and Memory: She does not exhibit impaired recent memory.   Physical Exam      Procedures    Assessment / Plan      Assessment/Plan:   Diagnoses and all orders for this visit:    1. Encounter for wellness examination (Primary)  -     TSH Rfx On Abnormal To Free T4; Future  -     Comprehensive Metabolic Panel; Future  -     CBC & Differential; Future    2. Irritable bowel syndrome with both constipation and diarrhea    3. Neuropathy    4. Encounter for lipid screening for cardiovascular disease  -     Lipid Panel; Future    5. Tobacco use    Other orders  -     meloxicam (MOBIC) 15 MG tablet; Take 1 tablet by mouth Daily.  Dispense: 90 tablet; Refill: 1       Assessment & Plan  1. Right-sided neuropathy.  She has a history of right-sided neuropathy predominantly affecting her fourth and fifth fingers. She has been consistently taking gabapentin 300 mg 3 times a day, which has been beneficial in managing her pain. However, if not taken, she experiences some breakthrough pain. Depending on her job as a , she may have intermittent flares, which CBD has been effective for, especially her frozen shoulder issues. She reports no excessive drowsiness from  the medication and is sleeping well, which effectively manages her symptoms further.    2. Arthritis.  She has arthritis in both feet and knees and uses meloxicam daily, which she tolerates well without any adverse effects such as stomach upset, pain, or bleeding.    3. Smoking cessation.  She is currently using nicotine gum to aid in quitting smoking. Annual CT lung cancer screening was completed yearly with no suspicious pulmonary nodules.    4. Health maintenance.  She has yearly skin checks with dermatology and follows up with psychiatry. Mammogram and colonoscopy have been completed. She is very active with her job but would benefit from strength training to increase muscle mass, which can be helpful postmenopause.    Follow-up  The patient will follow up in 6 months or sooner if any worsening symptoms.  Discussed current problems may cause a copay for this visit. Patient verbalized an understanding and agreement with plan.      Recommend seatbelt, sunscreen, helmet when necessary, smoke detectors and carbon monoxide detectors in the home.   Patient to schedule eye, dental exam if not up-to-date and dermatology if needed.    Explained and discussed patient's condition and plan of care including labs and radiology that may be ordered.  Discussed when to follow-up.  Discussed possible red flags and how to follow-up with those.  Viewed patient's medications and discussed common side effects and symptoms to report. Patient to continue current medications as advised.  Be compliant with medications. Patient to call clinic if they have any worsening, no improvement, does not tolerate medication, or any future concerns about treatment.  Questions and concerns addressed at this appointment.  Patient to report to ER for emergencies.  Patient verbalized an understanding and agreement with plan of care.      Follow Up:   Return in about 6 months (around 6/11/2025).    Patient or patient representative verbalized consent  for the use of Ambient Listening during the visit with  VANESSA Teixeira for chart documentation. 12/11/2024  08:35 EST      Health Maintenance, Female  Adopting a healthy lifestyle and getting preventive care can go a long way to promote health and wellness. Talk with your health care provider about what schedule of regular examinations is right for you. This is a good chance for you to check in with your provider about disease prevention and staying healthy.  In between checkups, there are plenty of things you can do on your own. Experts have done a lot of research about which lifestyle changes and preventive measures are most likely to keep you healthy. Ask your health care provider for more information.  Weight and diet  Eat a healthy diet  Be sure to include plenty of vegetables, fruits, low-fat dairy products, and lean protein.  Do not eat a lot of foods high in solid fats, added sugars, or salt.  Get regular exercise. This is one of the most important things you can do for your health.  Most adults should exercise for at least 150 minutes each week. The exercise should increase your heart rate and make you sweat (moderate-intensity exercise).  Most adults should also do strengthening exercises at least twice a week. This is in addition to the moderate-intensity exercise.     Maintain a healthy weight  Body mass index (BMI) is a measurement that can be used to identify possible weight problems. It estimates body fat based on height and weight. Your health care provider can help determine your BMI and help you achieve or maintain a healthy weight.  For females 20 years of age and older:  A BMI below 18.5 is considered underweight.  A BMI of 18.5 to 24.9 is normal.  A BMI of 25 to 29.9 is considered overweight.  A BMI of 30 and above is considered obese.     Watch levels of cholesterol and blood lipids  You should start having your blood tested for lipids and cholesterol at 20 years of age, then have this  test every 5 years.  You may need to have your cholesterol levels checked more often if:  Your lipid or cholesterol levels are high.  You are older than 50 years of age.  You are at high risk for heart disease.     Cancer screening  Lung Cancer  Lung cancer screening is recommended for adults 55-80 years old who are at high risk for lung cancer because of a history of smoking.  A yearly low-dose CT scan of the lungs is recommended for people who:  Currently smoke.  Have quit within the past 15 years.  Have at least a 30-pack-year history of smoking. A pack year is smoking an average of one pack of cigarettes a day for 1 year.  Yearly screening should continue until it has been 15 years since you quit.  Yearly screening should stop if you develop a health problem that would prevent you from having lung cancer treatment.     Breast Cancer  Practice breast self-awareness. This means understanding how your breasts normally appear and feel.  It also means doing regular breast self-exams. Let your health care provider know about any changes, no matter how small.  If you are in your 20s or 30s, you should have a clinical breast exam (CBE) by a health care provider every 1-3 years as part of a regular health exam.  If you are 40 or older, have a CBE every year. Also consider having a breast X-ray (mammogram) every year.  If you have a family history of breast cancer, talk to your health care provider about genetic screening.  If you are at high risk for breast cancer, talk to your health care provider about having an MRI and a mammogram every year.  Breast cancer gene (BRCA) assessment is recommended for women who have family members with BRCA-related cancers. BRCA-related cancers include:  Breast.  Ovarian.  Tubal.  Peritoneal cancers.  Results of the assessment will determine the need for genetic counseling and BRCA1 and BRCA2 testing.     Cervical Cancer  Your health care provider may recommend that you be screened  regularly for cancer of the pelvic organs (ovaries, uterus, and vagina). This screening involves a pelvic examination, including checking for microscopic changes to the surface of your cervix (Pap test). You may be encouraged to have this screening done every 3 years, beginning at age 21.  For women ages 30-65, health care providers may recommend pelvic exams and Pap testing every 3 years, or they may recommend the Pap and pelvic exam, combined with testing for human papilloma virus (HPV), every 5 years. Some types of HPV increase your risk of cervical cancer. Testing for HPV may also be done on women of any age with unclear Pap test results.  Other health care providers may not recommend any screening for nonpregnant women who are considered low risk for pelvic cancer and who do not have symptoms. Ask your health care provider if a screening pelvic exam is right for you.  If you have had past treatment for cervical cancer or a condition that could lead to cancer, you need Pap tests and screening for cancer for at least 20 years after your treatment. If Pap tests have been discontinued, your risk factors (such as having a new sexual partner) need to be reassessed to determine if screening should resume. Some women have medical problems that increase the chance of getting cervical cancer. In these cases, your health care provider may recommend more frequent screening and Pap tests.     Colorectal Cancer  This type of cancer can be detected and often prevented.  Routine colorectal cancer screening usually begins at 50 years of age and continues through 75 years of age.  Your health care provider may recommend screening at an earlier age if you have risk factors for colon cancer.  Your health care provider may also recommend using home test kits to check for hidden blood in the stool.  A small camera at the end of a tube can be used to examine your colon directly (sigmoidoscopy or colonoscopy). This is done to check  for the earliest forms of colorectal cancer.  Routine screening usually begins at age 50.  Direct examination of the colon should be repeated every 5-10 years through 75 years of age. However, you may need to be screened more often if early forms of precancerous polyps or small growths are found.     Skin Cancer  Check your skin from head to toe regularly.  Tell your health care provider about any new moles or changes in moles, especially if there is a change in a mole's shape or color.  Also tell your health care provider if you have a mole that is larger than the size of a pencil eraser.  Always use sunscreen. Apply sunscreen liberally and repeatedly throughout the day.  Protect yourself by wearing long sleeves, pants, a wide-brimmed hat, and sunglasses whenever you are outside.     Heart disease, diabetes, and high blood pressure  High blood pressure causes heart disease and increases the risk of stroke. High blood pressure is more likely to develop in:  People who have blood pressure in the high end of the normal range (130-139/85-89 mm Hg).  People who are overweight or obese.  People who are .  If you are 18-39 years of age, have your blood pressure checked every 3-5 years. If you are 40 years of age or older, have your blood pressure checked every year. You should have your blood pressure measured twice--once when you are at a hospital or clinic, and once when you are not at a hospital or clinic. Record the average of the two measurements. To check your blood pressure when you are not at a hospital or clinic, you can use:  An automated blood pressure machine at a pharmacy.  A home blood pressure monitor.  If you are between 55 years and 79 years old, ask your health care provider if you should take aspirin to prevent strokes.  Have regular diabetes screenings. This involves taking a blood sample to check your fasting blood sugar level.  If you are at a normal weight and have a low risk for  diabetes, have this test once every three years after 45 years of age.  If you are overweight and have a high risk for diabetes, consider being tested at a younger age or more often.  Preventing infection  Hepatitis B  If you have a higher risk for hepatitis B, you should be screened for this virus. You are considered at high risk for hepatitis B if:  You were born in a country where hepatitis B is common. Ask your health care provider which countries are considered high risk.  Your parents were born in a high-risk country, and you have not been immunized against hepatitis B (hepatitis B vaccine).  You have HIV or AIDS.  You use needles to inject street drugs.  You live with someone who has hepatitis B.  You have had sex with someone who has hepatitis B.  You get hemodialysis treatment.  You take certain medicines for conditions, including cancer, organ transplantation, and autoimmune conditions.     Hepatitis C  Blood testing is recommended for:  Everyone born from 1945 through 1965.  Anyone with known risk factors for hepatitis C.     Sexually transmitted infections (STIs)  You should be screened for sexually transmitted infections (STIs) including gonorrhea and chlamydia if:  You are sexually active and are younger than 24 years of age.  You are older than 24 years of age and your health care provider tells you that you are at risk for this type of infection.  Your sexual activity has changed since you were last screened and you are at an increased risk for chlamydia or gonorrhea. Ask your health care provider if you are at risk.  If you do not have HIV, but are at risk, it may be recommended that you take a prescription medicine daily to prevent HIV infection. This is called pre-exposure prophylaxis (PrEP). You are considered at risk if:  You are sexually active and do not regularly use condoms or know the HIV status of your partner(s).  You take drugs by injection.  You are sexually active with a partner who  has HIV.     Talk with your health care provider about whether you are at high risk of being infected with HIV. If you choose to begin PrEP, you should first be tested for HIV. You should then be tested every 3 months for as long as you are taking PrEP.  Pregnancy  If you are premenopausal and you may become pregnant, ask your health care provider about preconception counseling.  If you may become pregnant, take 400 to 800 micrograms (mcg) of folic acid every day.  If you want to prevent pregnancy, talk to your health care provider about birth control (contraception).  Osteoporosis and menopause  Osteoporosis is a disease in which the bones lose minerals and strength with aging. This can result in serious bone fractures. Your risk for osteoporosis can be identified using a bone density scan.  If you are 65 years of age or older, or if you are at risk for osteoporosis and fractures, ask your health care provider if you should be screened.  Ask your health care provider whether you should take a calcium or vitamin D supplement to lower your risk for osteoporosis.  Menopause may have certain physical symptoms and risks.  Hormone replacement therapy may reduce some of these symptoms and risks.  Talk to your health care provider about whether hormone replacement therapy is right for you.  Follow these instructions at home:  Schedule regular health, dental, and eye exams.  Stay current with your immunizations.  Do not use any tobacco products including cigarettes, chewing tobacco, or electronic cigarettes.  If you are pregnant, do not drink alcohol.  If you are breastfeeding, limit how much and how often you drink alcohol.  Limit alcohol intake to no more than 1 drink per day for nonpregnant women. One drink equals 12 ounces of beer, 5 ounces of wine, or 1½ ounces of hard liquor.  Do not use street drugs.  Do not share needles.  Ask your health care provider for help if you need support or information about quitting  drugs.  Tell your health care provider if you often feel depressed.  Tell your health care provider if you have ever been abused or do not feel safe at home.  This information is not intended to replace advice given to you by your health care provider. Make sure you discuss any questions you have with your health care provider.  Document Released: 07/02/2012 Document Revised: 05/25/2017 Document Reviewed: 09/20/2016  [employer] Patient Education © 2018 [employer]   Xiomara Taylor voices understanding and acceptance of this advice and will call back with any further questions or concerns. AVS with preventive healthcare tips printed for patient.     VANESSA Teixeira  [facility]

## 2024-12-23 DIAGNOSIS — R06.2 WHEEZING: ICD-10-CM

## 2024-12-24 DIAGNOSIS — G62.9 NEUROPATHY: ICD-10-CM

## 2024-12-24 RX ORDER — ALBUTEROL SULFATE 0.83 MG/ML
2.5 SOLUTION RESPIRATORY (INHALATION) EVERY 4 HOURS PRN
Qty: 90 EACH | Refills: 11 | Status: SHIPPED | OUTPATIENT
Start: 2024-12-24

## 2024-12-26 RX ORDER — GABAPENTIN 300 MG/1
300 CAPSULE ORAL 3 TIMES DAILY
Qty: 90 CAPSULE | Refills: 2 | Status: SHIPPED | OUTPATIENT
Start: 2024-12-26

## 2025-01-20 DIAGNOSIS — B00.1 FEVER BLISTER: ICD-10-CM

## 2025-01-21 RX ORDER — VALACYCLOVIR HYDROCHLORIDE 500 MG/1
500 TABLET, FILM COATED ORAL DAILY
Qty: 30 TABLET | Refills: 5 | Status: SHIPPED | OUTPATIENT
Start: 2025-01-21

## 2025-03-05 ENCOUNTER — PATIENT MESSAGE (OUTPATIENT)
Dept: INTERNAL MEDICINE | Facility: CLINIC | Age: 55
End: 2025-03-05
Payer: COMMERCIAL

## 2025-03-05 DIAGNOSIS — M15.0 PRIMARY OSTEOARTHRITIS INVOLVING MULTIPLE JOINTS: ICD-10-CM

## 2025-03-05 DIAGNOSIS — M25.519 SHOULDER PAIN, UNSPECIFIED CHRONICITY, UNSPECIFIED LATERALITY: ICD-10-CM

## 2025-03-05 DIAGNOSIS — M54.2 NECK PAIN: ICD-10-CM

## 2025-03-05 DIAGNOSIS — G62.9 NEUROPATHY: ICD-10-CM

## 2025-03-06 RX ORDER — MELOXICAM 15 MG/1
15 TABLET ORAL DAILY
Qty: 90 TABLET | Refills: 1 | Status: SHIPPED | OUTPATIENT
Start: 2025-03-06

## 2025-03-27 DIAGNOSIS — B00.1 FEVER BLISTER: ICD-10-CM

## 2025-03-28 RX ORDER — VALACYCLOVIR HYDROCHLORIDE 500 MG/1
500 TABLET, FILM COATED ORAL DAILY
Qty: 30 TABLET | Refills: 5 | Status: SHIPPED | OUTPATIENT
Start: 2025-03-28

## 2025-03-31 DIAGNOSIS — G62.9 NEUROPATHY: ICD-10-CM

## 2025-04-01 RX ORDER — GABAPENTIN 300 MG/1
300 CAPSULE ORAL 3 TIMES DAILY
Qty: 90 CAPSULE | Refills: 2 | Status: SHIPPED | OUTPATIENT
Start: 2025-04-01

## 2025-04-28 ENCOUNTER — TELEPHONE (OUTPATIENT)
Dept: INTERNAL MEDICINE | Facility: CLINIC | Age: 55
End: 2025-04-28

## 2025-04-28 NOTE — TELEPHONE ENCOUNTER
Continue supportive care and hydration. Take medications that were prescribed. Drink fluids frequently especially if fever or diarrhea. Patient to monitor urine output, monitor for signs of dehydration such as increased lethargy, dizziness, dry mucous membranes and decreased urine output.  Monitor fever and continue tylenol or ibuprofen ER for emergencies, dyspnea, unable to keep fluids down with nausea medications, unable to keep fever less than 102.0 with tylenol or ibuprofen, palpitations or chest pain.  Treat symptoms with over the counter medications. If no improvement or worsening, patient can request an earlier appointment or ER if worsening and severe symptoms.       Patient was advised Take Mucinex or Delsym for cough and congestion, Tylenol and ibuprofen regularly for fever or muscle aches. Recommended to irrigate the nose with a saline spray, for nose congestion. May use Cepacol lozenge or Chlora septic spray if you start to experience throat discomfort or cough. Recommend isolating for 5 days from symptom onset. and to wear a mask for an additional 5 days. Monitor oxygen saturations. If he has any severe chest pain, shortness of breath, or feels labored, he is to go to the emergency department.

## 2025-04-28 NOTE — TELEPHONE ENCOUNTER
Caller: Xiomara Taylor    Relationship: Self    Best call back number: [phone]    What medication are you requesting: COVID MEDICATION/HEADACHE     What are your current symptoms: HEADACHE, CONGESTION, COUGH, BURNING IN CHEST    How long have you been experiencing symptoms: 04/25/25    Have you had these symptoms before:    [x] Yes  [] No    Have you been treated for these symptoms before:   [] Yes  [x] No    If a prescription is needed, what is your preferred pharmacy and phone number:  [facility] [id number] [address] [phone] [facility] [phone]      Additional notes: PATIENT TESTED POSITIVE FOR COVID FOR AN AT HOME TEST TWICE. HAS AN EXCRUCIATING HEADACHE CONTINUOUSLY AND IS REQUESTING IF THERE IS ANYTHING ELSE SHE CAN TAKE BESIDES ADVIL AND TYLENOL AND ALSO WHEN SHE CAN GO BACK TO WORK. PLEASE CALL TO ADVISE

## 2025-04-29 NOTE — TELEPHONE ENCOUNTER
I called patient and discussed going back to work. Patient can go back after 5 days as long as symptoms are improving and she has been without a fever for 24 hours without Tylenol or Advil.  I will send a work excuse to [facility].  We discussed Paxlovid and patient declined.  She will reach out if any worsening symptoms.

## 2025-04-30 ENCOUNTER — TELEPHONE (OUTPATIENT)
Dept: INTERNAL MEDICINE | Facility: CLINIC | Age: 55
End: 2025-04-30
Payer: COMMERCIAL

## 2025-04-30 NOTE — TELEPHONE ENCOUNTER
Called patient to see if she needs work note faxed or wanted to pick it up in person. She said that she is still testing positive and still has a cough and headache. She said she will get get [facility] if it has to be extended

## 2025-05-01 NOTE — TELEPHONE ENCOUNTER
Caller: Xiomara Taylor    Relationship to patient: Self    Best call back number: [phone]    Date of positive COVID19 test: 04/26/2025    Date of possible COVID19 exposure: 04/25/2025    COVID19 symptoms: CONGESTION, COUGH AND DULL HEADACHE   Date of initial quarantine: 04/25/2025    Additional information or concerns: THE PATIENT REPORTS THAT SHE WAS ADVISED BY THE PRACTICE THAT SHE COULD RETURN TO WORK TODAY, 05/01/2025, BUT STATES THAT SHE STILL IS NOT FEELING WELL AND IS STILL TESTING POSITIVE FOR COVID TODAY, 05/01/2025.    THE PATIENT STATED THAT SHE LIVES WITH HER 74 YEAR OLD MOTHER, HAS BEEN WIPING EVERYTHING IN THE HOME WITH BLEACH WIPES TO TRY AND NOT EXPOSE HER MOTHER. THE PATIENT WANTS TO KNOW WHEN IT WILL BE SAFE NOT TO WIPE THINGS DOWN WITH BLEACH WIPES THAT SHE TOUCHES AND WHEN SHE CAN RETURN TO WORK.     PLEASE CALL THE PATIENT AND ADVISE.     What is the patients preferred pharmacy: [facility] [id number] - [address] [phone] [place] [phone]

## 2025-05-01 NOTE — TELEPHONE ENCOUNTER
Most people are able to go to work after  day 5  if symptoms are improving and afebrile for 24 hours.  If patient's symptoms are not improving she may need more time.  I recommend she wear a mask and continue to sanitize home for 10 days to decrease risk of infection .

## 2025-05-01 NOTE — TELEPHONE ENCOUNTER
I left a message on the patients voicemail to call our office back, office number provided.     HUB relay:    Most people are able to go to work after  day 5  if symptoms are improving and afebrile for 24 hours.  If patient's symptoms are not improving she may need more time.  I recommend she wear a mask and continue to sanitize home for 10 days to decrease risk of infection .

## 2025-05-01 NOTE — TELEPHONE ENCOUNTER
Name: Xiomara Taylor NASIM      Relationship: Self      Best Callback Number: [phone]       HUB PROVIDED THE RELAY MESSAGE FROM THE OFFICE      PATIENT: VOICED UNDERSTANDING AND HAS NO FURTHER QUESTIONS AT THIS TIME    ADDITIONAL INFORMATION: PATIENT IS ASKING TO HAVE A NOTE BE UPLOADED TO [employer] TO HAVE HER RETURN TO WORK ON MONDAY.

## 2025-05-05 ENCOUNTER — OFFICE VISIT (OUTPATIENT)
Dept: INTERNAL MEDICINE | Facility: CLINIC | Age: 55
End: 2025-05-05
Payer: COMMERCIAL

## 2025-05-05 VITALS
HEART RATE: 80 BPM | TEMPERATURE: 97.8 F | OXYGEN SATURATION: 97 % | WEIGHT: 146 LBS | BODY MASS INDEX: 23.46 KG/M2 | DIASTOLIC BLOOD PRESSURE: 74 MMHG | HEIGHT: 66 IN | SYSTOLIC BLOOD PRESSURE: 104 MMHG | RESPIRATION RATE: 14 BRPM

## 2025-05-05 DIAGNOSIS — Z86.16 HISTORY OF COVID-19: ICD-10-CM

## 2025-05-05 DIAGNOSIS — J01.10 ACUTE NON-RECURRENT FRONTAL SINUSITIS: Primary | ICD-10-CM

## 2025-05-05 PROCEDURE — 99213 OFFICE O/P EST LOW 20 MIN: CPT | Performed by: NURSE PRACTITIONER

## 2025-05-05 RX ORDER — DOXYCYCLINE 100 MG/1
100 CAPSULE ORAL 2 TIMES DAILY
Qty: 20 CAPSULE | Refills: 0 | Status: SHIPPED | OUTPATIENT
Start: 2025-05-05

## 2025-05-05 RX ORDER — METHYLPREDNISOLONE 4 MG/1
TABLET ORAL
Qty: 21 TABLET | Refills: 0 | Status: SHIPPED | OUTPATIENT
Start: 2025-05-05

## 2025-05-05 NOTE — PROGRESS NOTES
Patient Name: Xiomara Taylor  : 1970   MRN: [id number]     Chief Complaint:    Chief Complaint   Patient presents with    covid     Follow up       History of Present Illness: Xiomara Taylor is a 54 y.o. female.  History of Present Illness  The patient presents for evaluation of sinus infection.    Sinus Infection  - Persistent sinus infection characterized by pain, pressure, and a dull headache since Thursday, 2025  - Intermittent coughing, productive but not green in color  - Significant pressure when bending over  - Sensation of overnight drainage for the past two days  - Brief period of fever on the second day with a temperature of 99 degrees  - No gastrointestinal symptoms such as nausea, vomiting, or diarrhea  - Positive COVID on 25    Medication History  - Previously tolerated steroids well  - Developed hives after taking Augmentin and fluconazole concurrently    Mask Usage  - Using a mask for protection  - 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  - Requesting a doctor's note for her employer indicating that she can return to work on Wednesday    Ear Infection Concerns  - Contacted insurance's nurse helpline due to concerns about a potential ear infection  - Experiencing ear popping  - Nurse advised her to seek medical attention to rule out pneumonia  - No shortness of breath or wheezing  - Wearing a mask exacerbates her symptoms    COVID-19 Concerns  - Interested in undergoing a COVID-19 test today  - This is the first day that the line on her home test has appeared faint    Supplemental information: None         Subjective     Review of System: Review of Systems     Medications:     Current Outpatient Medications:     albuterol (PROVENTIL) (2.5 MG/3ML) 0.083% nebulizer solution, Take 2.5 mg by nebulization Every 4 (Four) Hours As Needed for Wheezing., Disp: 90 each, Rfl: 11    albuterol sulfate  (90 Base) MCG/ACT inhaler, INHALE TWO PUFFS BY MOUTH  "EVERY 4 HOURS AS NEEDED FOR WHEEZING, Disp: 18 g, Rfl: 0    CBD oil (cannabidiol) capsule, Take 1.5 each by mouth Daily., Disp: , Rfl:     fexofenadine-pseudoephedrine (ALLEGRA-D 24) 180-240 MG per 24 hr tablet, Take 1 tablet by mouth Daily., Disp: , Rfl:     gabapentin (NEURONTIN) 300 MG capsule, Take 1 capsule by mouth 3 (Three) Times a Day., Disp: 90 capsule, Rfl: 2    lamoTRIgine (LaMICtal) 200 MG tablet, , Disp: , Rfl:     meloxicam (MOBIC) 15 MG tablet, Take 1 tablet by mouth Daily., Disp: 90 tablet, Rfl: 1    propranolol (INDERAL) 10 MG tablet, Take 1 tablet by mouth 2 (Two) Times a Day., Disp: , Rfl:     QUEtiapine (SEROquel) 100 MG tablet, , Disp: , Rfl:     valACYclovir (VALTREX) 500 MG tablet, Take 1 tablet by mouth Daily., Disp: 30 tablet, Rfl: 5    doxycycline (VIBRAMYCIN) 100 MG capsule, Take 1 capsule by mouth 2 (Two) Times a Day., Disp: 20 capsule, Rfl: 0    methylPREDNISolone (MEDROL) 4 MG dose pack, Take as directed on package instructions., Disp: 21 tablet, Rfl: 0    Allergies:   Allergies   Allergen Reactions    Augmentin [Amoxicillin-Pot Clavulanate] Hives    Diflucan [Fluconazole] Hives    Ketamine Anaphylaxis       Objective     Physical Exam:   Vital Signs:   Vitals:    05/05/25 0928   BP: 104/74   BP Location: Right arm   Patient Position: Sitting   Cuff Size: Adult   Pulse: 80   Resp: 14   Temp: 97.8 °F (36.6 °C)   TempSrc: Infrared   SpO2: 97%   Weight: 66.2 kg (146 lb)   Height: 167.6 cm (66\")   PainSc: 3      Body mass index is 23.57 kg/m². BMI is within normal parameters. No other follow-up for BMI required.      Physical Exam  Constitutional:       General: She is not in acute distress.     Appearance: She is not ill-appearing.   HENT:      Head: Normocephalic.      Right Ear: Tympanic membrane normal.      Left Ear: Tympanic membrane normal.      Nose: Congestion and rhinorrhea present.      Right Sinus: Frontal sinus tenderness present. No maxillary sinus tenderness.      Left Sinus: " Frontal sinus tenderness present. No maxillary sinus tenderness.   Cardiovascular:      Rate and Rhythm: Normal rate and regular rhythm.      Heart sounds: Normal heart sounds. No murmur heard.  Pulmonary:      Breath sounds: Normal breath sounds.   Abdominal:      General: Bowel sounds are normal.      Tenderness: There is no abdominal tenderness.   Lymphadenopathy:      Cervical: No cervical adenopathy.   Neurological:      General: No focal deficit present.      Mental Status: She is oriented to person, place, and time.   Psychiatric:         Mood and Affect: Mood normal.     Physical Exam  Mouth/Throat: Mucous membranes moist, no erythema, no exudate  Respiratory: Clear to auscultation, no wheezing, rales or rhonchi       Results       Assessment / Plan      Assessment/Plan:   Diagnoses and all orders for this visit:    1. Acute non-recurrent frontal sinusitis (Primary)  -     doxycycline (VIBRAMYCIN) 100 MG capsule; Take 1 capsule by mouth 2 (Two) Times a Day.  Dispense: 20 capsule; Refill: 0  -     methylPREDNISolone (MEDROL) 4 MG dose pack; Take as directed on package instructions.  Dispense: 21 tablet; Refill: 0    2. History of COVID-19       Assessment & Plan  1. Sinusitis  - Symptoms began on Thursday, 04/25/2025, including sinus pain, pressure, headache, and a random cough  - No significant fever, but a brief temperature of 99°F was noted  - Examination reveals tenderness and pressure in the sinus areas  - Doxycycline prescribed to cover the sinus infection    - Take antibiotic with food and remain upright for 30 minutes post-administration    - Probiotic recommended to mitigate potential side effects of the antibiotic    - Kefir drink suggested as an alternative probiotic source  - Complete the full 10-day course of antibiotics  - Steroid prescribed to alleviate inflammation in the lungs and sinuses  - Maintain good hand hygiene and avoid touching the face  - Doctor's note provided for return to work on  Wednesday, 05/07/2025  - Return for further evaluation if symptoms worsen or do not improve after completing the antibiotic course    2.  Cough  - Advised by the nurse helpline to seek medical attention to rule out pneumonia  - Lung examination is clear, but some inflammation is present  - Doxycycline will also cover lung infection if present  - Monitor symptoms and return if there is no improvement           Explained and discussed patient's condition and plan of care including labs and radiology that may be ordered.  Discussed when to follow-up.  Discussed possible red flags and how to follow-up with those.  Viewed patient's medications and discussed common side effects and symptoms to report. Patient to continue current medications as advised.  Be compliant with medications. Patient to call clinic if they have any worsening, no improvement, does not tolerate medication, or any future concerns about treatment.  Questions and concerns addressed at this appointment.  Patient to report to ER for emergencies.  Patient verbalized an understanding and agreement with plan of care.      Follow Up:   Return for Next scheduled follow up.  Patient or patient representative verbalized consent for the use of Ambient Listening during the visit with  VANESSA Teixeira for chart documentation. 5/5/2025  09:54 EDT    VANESSA Teixeira  [facility]

## 2025-05-05 NOTE — LETTER
May 5, 2025     Patient: Xiomara Taylor   YOB: 1970   Date of Visit: 5/5/2025       To Whom It May Concern:    It is my medical opinion that Xiomara Taylor may return to work in two days (5/7/25).         Sincerely,        VANESSA Teixeira    CC: No Recipients

## 2025-06-06 ENCOUNTER — OFFICE VISIT (OUTPATIENT)
Dept: INTERNAL MEDICINE | Facility: CLINIC | Age: 55
End: 2025-06-06
Payer: COMMERCIAL

## 2025-06-06 VITALS
RESPIRATION RATE: 17 BRPM | SYSTOLIC BLOOD PRESSURE: 124 MMHG | DIASTOLIC BLOOD PRESSURE: 74 MMHG | TEMPERATURE: 98.1 F | BODY MASS INDEX: 23.4 KG/M2 | WEIGHT: 145 LBS | HEART RATE: 82 BPM

## 2025-06-06 DIAGNOSIS — G62.9 NEUROPATHY: ICD-10-CM

## 2025-06-06 DIAGNOSIS — N30.00 ACUTE CYSTITIS WITHOUT HEMATURIA: Primary | ICD-10-CM

## 2025-06-06 DIAGNOSIS — M15.0 PRIMARY OSTEOARTHRITIS INVOLVING MULTIPLE JOINTS: ICD-10-CM

## 2025-06-06 LAB
BILIRUB BLD-MCNC: NEGATIVE MG/DL
CLARITY, POC: CLEAR
COLOR UR: YELLOW
EXPIRATION DATE: ABNORMAL
GLUCOSE UR STRIP-MCNC: NEGATIVE MG/DL
KETONES UR QL: NEGATIVE
LEUKOCYTE EST, POC: NEGATIVE
Lab: ABNORMAL
NITRITE UR-MCNC: NEGATIVE MG/ML
PH UR: 5 [PH] (ref 5–8)
PROT UR STRIP-MCNC: ABNORMAL MG/DL
RBC # UR STRIP: NEGATIVE /UL
SP GR UR: 1.02 (ref 1–1.03)
UROBILINOGEN UR QL: NORMAL

## 2025-06-06 PROCEDURE — 87086 URINE CULTURE/COLONY COUNT: CPT | Performed by: NURSE PRACTITIONER

## 2025-06-06 PROCEDURE — 81003 URINALYSIS AUTO W/O SCOPE: CPT | Performed by: NURSE PRACTITIONER

## 2025-06-06 PROCEDURE — 99214 OFFICE O/P EST MOD 30 MIN: CPT | Performed by: NURSE PRACTITIONER

## 2025-06-06 RX ORDER — NITROFURANTOIN 25; 75 MG/1; MG/1
100 CAPSULE ORAL 2 TIMES DAILY
Qty: 14 CAPSULE | Refills: 0 | Status: SHIPPED | OUTPATIENT
Start: 2025-06-06

## 2025-06-06 RX ORDER — PHENAZOPYRIDINE HYDROCHLORIDE 200 MG/1
200 TABLET, FILM COATED ORAL 3 TIMES DAILY PRN
Qty: 6 TABLET | Refills: 0 | Status: SHIPPED | OUTPATIENT
Start: 2025-06-06

## 2025-06-06 NOTE — PROGRESS NOTES
Patient Name: Xiomara Taylor  : 1970   MRN: [id number]     Chief Complaint:    Chief Complaint   Patient presents with    Dysuria       History of Present Illness: Xiomara Taylor is a 54 y.o. female.  History of Present Illness  The patient is a 54-year-old female who presents for evaluation of dysuria and follow-up for neuropathy.    Dysuria  - Experiencing dysuria for the past 2 days  - Accompanied by urinary frequency, urgency, and a burning sensation  - No fevers, hematuria, or flank pain  - No recent UTI  - No relief from Azo despite attempts to alleviate symptoms  - Increasing fluid intake    Neuropathy  - Right-sided neuropathy predominantly affecting her 4th and 5th fingers  - Takes gabapentin 300 mg 3 times a day, which has been beneficial in managing her pain  - Tolerates gabapentin well without any side effects  - Helps her to sleep well and tolerate her job as a   - Reports no excessive drowsiness from the medication  - Additionally takes meloxicam for arthritis       Subjective     Review of System: Review of Systems     Medications:     Current Outpatient Medications:     albuterol (PROVENTIL) (2.5 MG/3ML) 0.083% nebulizer solution, Take 2.5 mg by nebulization Every 4 (Four) Hours As Needed for Wheezing., Disp: 90 each, Rfl: 11    albuterol sulfate  (90 Base) MCG/ACT inhaler, INHALE TWO PUFFS BY MOUTH EVERY 4 HOURS AS NEEDED FOR WHEEZING, Disp: 18 g, Rfl: 0    CBD oil (cannabidiol) capsule, Take 1.5 each by mouth Daily., Disp: , Rfl:     fexofenadine-pseudoephedrine (ALLEGRA-D 24) 180-240 MG per 24 hr tablet, Take 1 tablet by mouth Daily., Disp: , Rfl:     gabapentin (NEURONTIN) 300 MG capsule, Take 1 capsule by mouth 3 (Three) Times a Day., Disp: 90 capsule, Rfl: 2    lamoTRIgine (LaMICtal) 200 MG tablet, , Disp: , Rfl:     meloxicam (MOBIC) 15 MG tablet, Take 1 tablet by mouth Daily., Disp: 90 tablet, Rfl: 1    methylPREDNISolone (MEDROL) 4 MG dose pack, Take as directed on  package instructions., Disp: 21 tablet, Rfl: 0    propranolol (INDERAL) 10 MG tablet, Take 1 tablet by mouth 2 (Two) Times a Day., Disp: , Rfl:     QUEtiapine (SEROquel) 100 MG tablet, , Disp: , Rfl:     valACYclovir (VALTREX) 500 MG tablet, Take 1 tablet by mouth Daily., Disp: 30 tablet, Rfl: 5    nitrofurantoin, macrocrystal-monohydrate, (Macrobid) 100 MG capsule, Take 1 capsule by mouth 2 (Two) Times a Day., Disp: 14 capsule, Rfl: 0    phenazopyridine (Pyridium) 200 MG tablet, Take 1 tablet by mouth 3 (Three) Times a Day As Needed for Bladder Spasms., Disp: 6 tablet, Rfl: 0    Allergies:   Allergies   Allergen Reactions    Augmentin [Amoxicillin-Pot Clavulanate] Hives    Diflucan [Fluconazole] Hives    Ketamine Anaphylaxis       Objective     Physical Exam:   Vital Signs:   Vitals:    06/06/25 1240   BP: 124/74   Pulse: 82   Resp: 17   Temp: 98.1 °F (36.7 °C)   Weight: 65.8 kg (145 lb)     Body mass index is 23.4 kg/m². BMI is within normal parameters. No other follow-up for BMI required.      Physical Exam  Constitutional:       Comments: Appears in pain   Cardiovascular:      Heart sounds: Normal heart sounds.   Pulmonary:      Breath sounds: Normal breath sounds.   Abdominal:      General: Bowel sounds are normal.      Palpations: Abdomen is soft.      Tenderness: There is no abdominal tenderness. There is no right CVA tenderness or left CVA tenderness.       Physical Exam         Results       Assessment / Plan      Assessment/Plan:     Problems Addressed this Visit    None  Visit Diagnoses         Acute cystitis without hematuria    -  Primary    Relevant Medications    nitrofurantoin, macrocrystal-monohydrate, (Macrobid) 100 MG capsule    phenazopyridine (Pyridium) 200 MG tablet    Other Relevant Orders    POC Urinalysis Dipstick, Automated (Completed)    Urine Culture - , Urine, Clean Catch (Completed)      Primary osteoarthritis involving multiple joints        Relevant Orders    Comprehensive Metabolic  Panel    CBC & Differential      Neuropathy              Diagnoses         Codes Comments      Acute cystitis without hematuria    -  Primary ICD-10-CM: N30.00  ICD-9-CM: 595.0       Primary osteoarthritis involving multiple joints     ICD-10-CM: M15.0  ICD-9-CM: 715.98       Neuropathy     ICD-10-CM: G62.9  ICD-9-CM: 355.9            Assessment & Plan  1. Urinary Tract Infection  - Start Macrobid and increase fluid intake  - Send urine culture for further analysis  - Reach out in 1 to 2 days if no improvement or symptoms worsen    Brief Urine Lab Results  (Last result in the past 365 days)        Color   Clarity   Blood   Leuk Est   Nitrite   Protein   CREAT   Urine HCG        06/06/25 1338 Yellow   Clear   Negative   Negative   Negative   Trace                   2. Neuropathy  - Continue gabapentin 300 mg 3 times a day  - Controlled substance agreement updated  - Medication effective in managing pain with no reported side effects or concerns for aberrancy    3. Arthritis  - Takes meloxicam for arthritis  - Labs will be checked concerning this medication    Follow-up  - Follow up in 6 months for annual wellness exam       Explained and discussed patient's condition and plan of care including labs and radiology that may be ordered.  Discussed when to follow-up.  Discussed possible red flags and how to follow-up with those.  Viewed patient's medications and discussed common side effects and symptoms to report. Patient to continue current medications as advised.  Be compliant with medications. Patient to call clinic if they have any worsening, no improvement, does not tolerate medication, or any future concerns about treatment.  Questions and concerns addressed at this appointment.  Patient to report to ER for emergencies.  Patient verbalized an understanding and agreement with plan of care.      Follow Up:   Return for Next scheduled follow up.  Patient or patient representative verbalized consent for the use of  Ambient Listening during the visit with  VANESSA Teixeira for chart documentation. 6/8/2025  13:20 EDT    VANESSA Teixeira  [facility]

## 2025-06-06 NOTE — LETTER
June 6, 2025     Patient: Xiomara Taylor   YOB: 1970   Date of Visit: 6/6/2025       To Whom It May Concern:    It is my medical opinion that Xiomara Taylor may return to work 6/9/25.          Sincerely,        VANESSA Teixeira    CC: No Recipients

## 2025-06-08 ENCOUNTER — RESULTS FOLLOW-UP (OUTPATIENT)
Dept: INTERNAL MEDICINE | Facility: CLINIC | Age: 55
End: 2025-06-08
Payer: COMMERCIAL

## 2025-06-08 DIAGNOSIS — R35.0 URINARY FREQUENCY: Primary | ICD-10-CM

## 2025-06-08 LAB — BACTERIA SPEC AEROBE CULT: NO GROWTH

## 2025-06-10 RX ORDER — OXYBUTYNIN CHLORIDE 5 MG/1
5 TABLET, EXTENDED RELEASE ORAL DAILY
Qty: 90 TABLET | Refills: 0 | Status: SHIPPED | OUTPATIENT
Start: 2025-06-10

## 2025-06-10 NOTE — TELEPHONE ENCOUNTER
Caller: Xiomara Taylor     Relationship: SELF    Best call back number: [phone]     What is your medical concern? SAME SYMPTOMS AS BEFORE JUST LESS    How long has this issue been going on? SINCE THURSDAY 6/5/25    Is your provider already aware of this issue? YES    Have you been treated for this issue?YES    PATIENT IS INQUIRING ON WHAT STEPS TO TAKE NEXT SINCE THE SYMPTOMS HAVE NOT COMPLETELY RECEDED.

## 2025-06-11 ENCOUNTER — LAB (OUTPATIENT)
Dept: INTERNAL MEDICINE | Facility: CLINIC | Age: 55
End: 2025-06-11
Payer: COMMERCIAL

## 2025-06-11 DIAGNOSIS — R35.0 URINARY FREQUENCY: Primary | ICD-10-CM

## 2025-06-11 DIAGNOSIS — R35.0 URINARY FREQUENCY: ICD-10-CM

## 2025-06-11 DIAGNOSIS — M15.0 PRIMARY OSTEOARTHRITIS INVOLVING MULTIPLE JOINTS: ICD-10-CM

## 2025-06-11 LAB
ALBUMIN SERPL-MCNC: 4.3 G/DL (ref 3.5–5.2)
ALBUMIN/GLOB SERPL: 1.6 G/DL
ALP SERPL-CCNC: 89 U/L (ref 39–117)
ALT SERPL W P-5'-P-CCNC: 17 U/L (ref 1–33)
ANION GAP SERPL CALCULATED.3IONS-SCNC: 15.9 MMOL/L (ref 5–15)
AST SERPL-CCNC: 24 U/L (ref 1–32)
BACTERIA UR QL AUTO: NORMAL /HPF
BASOPHILS # BLD AUTO: 0.07 10*3/MM3 (ref 0–0.2)
BASOPHILS NFR BLD AUTO: 1 % (ref 0–1.5)
BILIRUB BLD-MCNC: NEGATIVE MG/DL
BILIRUB SERPL-MCNC: 0.4 MG/DL (ref 0–1.2)
BUN SERPL-MCNC: 12 MG/DL (ref 6–20)
BUN/CREAT SERPL: 16.7 (ref 7–25)
CALCIUM SPEC-SCNC: 9.6 MG/DL (ref 8.6–10.5)
CHLORIDE SERPL-SCNC: 105 MMOL/L (ref 98–107)
CLARITY, POC: CLEAR
CO2 SERPL-SCNC: 25.1 MMOL/L (ref 22–29)
COLOR UR: YELLOW
CREAT SERPL-MCNC: 0.72 MG/DL (ref 0.57–1)
DEPRECATED RDW RBC AUTO: 41.2 FL (ref 37–54)
EGFRCR SERPLBLD CKD-EPI 2021: 100.1 ML/MIN/1.73
EOSINOPHIL # BLD AUTO: 0.14 10*3/MM3 (ref 0–0.4)
EOSINOPHIL NFR BLD AUTO: 2 % (ref 0.3–6.2)
ERYTHROCYTE [DISTWIDTH] IN BLOOD BY AUTOMATED COUNT: 12.6 % (ref 12.3–15.4)
EXPIRATION DATE: NORMAL
GLOBULIN UR ELPH-MCNC: 2.7 GM/DL
GLUCOSE SERPL-MCNC: 74 MG/DL (ref 65–99)
GLUCOSE UR STRIP-MCNC: NEGATIVE MG/DL
HCT VFR BLD AUTO: 43.7 % (ref 34–46.6)
HGB BLD-MCNC: 15 G/DL (ref 12–15.9)
HYALINE CASTS UR QL AUTO: NORMAL /LPF
IMM GRANULOCYTES # BLD AUTO: 0.01 10*3/MM3 (ref 0–0.05)
IMM GRANULOCYTES NFR BLD AUTO: 0.1 % (ref 0–0.5)
KETONES UR QL: NEGATIVE
LEUKOCYTE EST, POC: NEGATIVE
LYMPHOCYTES # BLD AUTO: 2.57 10*3/MM3 (ref 0.7–3.1)
LYMPHOCYTES NFR BLD AUTO: 36 % (ref 19.6–45.3)
Lab: NORMAL
MCH RBC QN AUTO: 30.7 PG (ref 26.6–33)
MCHC RBC AUTO-ENTMCNC: 34.3 G/DL (ref 31.5–35.7)
MCV RBC AUTO: 89.5 FL (ref 79–97)
MONOCYTES # BLD AUTO: 0.7 10*3/MM3 (ref 0.1–0.9)
MONOCYTES NFR BLD AUTO: 9.8 % (ref 5–12)
NEUTROPHILS NFR BLD AUTO: 3.65 10*3/MM3 (ref 1.7–7)
NEUTROPHILS NFR BLD AUTO: 51.1 % (ref 42.7–76)
NITRITE UR-MCNC: NEGATIVE MG/ML
NRBC BLD AUTO-RTO: 0 /100 WBC (ref 0–0.2)
PH UR: 8 [PH] (ref 5–8)
PLATELET # BLD AUTO: 303 10*3/MM3 (ref 140–450)
PMV BLD AUTO: 8.8 FL (ref 6–12)
POTASSIUM SERPL-SCNC: 4.6 MMOL/L (ref 3.5–5.2)
PROT SERPL-MCNC: 7 G/DL (ref 6–8.5)
PROT UR STRIP-MCNC: NEGATIVE MG/DL
RBC # BLD AUTO: 4.88 10*6/MM3 (ref 3.77–5.28)
RBC # UR STRIP: NEGATIVE /UL
RBC # UR STRIP: NORMAL /HPF
REF LAB TEST METHOD: NORMAL
SODIUM SERPL-SCNC: 146 MMOL/L (ref 136–145)
SP GR UR: 1 (ref 1–1.03)
SQUAMOUS #/AREA URNS HPF: NORMAL /HPF
UROBILINOGEN UR QL: NORMAL
WBC # UR STRIP: NORMAL /HPF
WBC NRBC COR # BLD AUTO: 7.14 10*3/MM3 (ref 3.4–10.8)

## 2025-06-11 PROCEDURE — 87086 URINE CULTURE/COLONY COUNT: CPT | Performed by: NURSE PRACTITIONER

## 2025-06-11 PROCEDURE — 81015 MICROSCOPIC EXAM OF URINE: CPT | Performed by: NURSE PRACTITIONER

## 2025-06-11 PROCEDURE — 80053 COMPREHEN METABOLIC PANEL: CPT | Performed by: NURSE PRACTITIONER

## 2025-06-11 PROCEDURE — 36415 COLL VENOUS BLD VENIPUNCTURE: CPT | Performed by: NURSE PRACTITIONER

## 2025-06-11 PROCEDURE — 81003 URINALYSIS AUTO W/O SCOPE: CPT | Performed by: NURSE PRACTITIONER

## 2025-06-11 PROCEDURE — 85025 COMPLETE CBC W/AUTO DIFF WBC: CPT | Performed by: NURSE PRACTITIONER

## 2025-06-12 LAB — BACTERIA SPEC AEROBE CULT: NO GROWTH

## 2025-07-09 DIAGNOSIS — G62.9 NEUROPATHY: ICD-10-CM

## 2025-07-10 RX ORDER — GABAPENTIN 300 MG/1
300 CAPSULE ORAL 3 TIMES DAILY
Qty: 90 CAPSULE | Refills: 2 | Status: SHIPPED | OUTPATIENT
Start: 2025-07-10

## 2025-07-10 NOTE — TELEPHONE ENCOUNTER
Caller: Brandon Xiomara D    Relationship: Self    Best call back number: [phone]     Requested Prescriptions:   Requested Prescriptions     Pending Prescriptions Disp Refills    gabapentin (NEURONTIN) 300 MG capsule [Pharmacy Med Name: GABAPENTIN 300 MG CAPSULE] 90 capsule      Sig: TAKE 1 CAPSULE BY MOUTH 3 TIMES A DAY        Pharmacy where request should be sent: [facility] [id number] [address] [phone] [place] [phone]      Last office visit with prescribing clinician: 6/6/2025   Last telemedicine visit with prescribing clinician: Visit date not found   Next office visit with prescribing clinician: 12/11/2025     Additional details provided by patient:     Does the patient have less than a 3 day supply:  [x] Yes  [] No    Would you like a call back once the refill request has been completed: [x] Yes [] No    If the office needs to give you a call back, can they leave a voicemail: [x] Yes [] No    Nevaeh Hennessy Rep   07/10/25 12:56 EDT

## 2025-07-10 NOTE — TELEPHONE ENCOUNTER
Last office visit (LOV) for chronic condition 12/11/2024  Next office visit (NOV) 12/11/2025  CSA 6/10/2024

## 2025-07-24 ENCOUNTER — TRANSCRIBE ORDERS (OUTPATIENT)
Dept: ADMINISTRATIVE | Facility: HOSPITAL | Age: 55
End: 2025-07-24
Payer: COMMERCIAL

## 2025-07-24 DIAGNOSIS — Z12.31 VISIT FOR SCREENING MAMMOGRAM: Primary | ICD-10-CM
